# Patient Record
Sex: FEMALE | Race: WHITE | Employment: FULL TIME | URBAN - METROPOLITAN AREA
[De-identification: names, ages, dates, MRNs, and addresses within clinical notes are randomized per-mention and may not be internally consistent; named-entity substitution may affect disease eponyms.]

---

## 2019-02-04 ENCOUNTER — OFFICE VISIT (OUTPATIENT)
Dept: ORTHOPEDIC SURGERY | Age: 54
End: 2019-02-04
Payer: COMMERCIAL

## 2019-02-04 VITALS
WEIGHT: 115.96 LBS | HEIGHT: 64 IN | HEART RATE: 102 BPM | DIASTOLIC BLOOD PRESSURE: 64 MMHG | SYSTOLIC BLOOD PRESSURE: 100 MMHG | BODY MASS INDEX: 19.8 KG/M2

## 2019-02-04 DIAGNOSIS — S72.92XA CLOSED FRACTURE OF LEFT FEMUR, UNSPECIFIED FRACTURE MORPHOLOGY, UNSPECIFIED PORTION OF FEMUR, INITIAL ENCOUNTER (HCC): Primary | ICD-10-CM

## 2019-02-04 DIAGNOSIS — M25.562 ACUTE PAIN OF LEFT KNEE: ICD-10-CM

## 2019-02-04 PROCEDURE — 99242 OFF/OP CONSLTJ NEW/EST SF 20: CPT | Performed by: ORTHOPAEDIC SURGERY

## 2019-02-12 ENCOUNTER — OFFICE VISIT (OUTPATIENT)
Dept: ORTHOPEDIC SURGERY | Age: 54
End: 2019-02-12
Payer: COMMERCIAL

## 2019-02-12 VITALS
SYSTOLIC BLOOD PRESSURE: 140 MMHG | DIASTOLIC BLOOD PRESSURE: 79 MMHG | HEIGHT: 64 IN | WEIGHT: 147 LBS | BODY MASS INDEX: 25.1 KG/M2 | HEART RATE: 101 BPM

## 2019-02-12 DIAGNOSIS — S72.92XA CLOSED FRACTURE OF LEFT FEMUR, UNSPECIFIED FRACTURE MORPHOLOGY, INITIAL ENCOUNTER (HCC): Primary | ICD-10-CM

## 2019-02-12 PROCEDURE — L1812 KO ELASTIC W/JOINTS PRE OTS: HCPCS | Performed by: ORTHOPAEDIC SURGERY

## 2019-02-12 PROCEDURE — 99213 OFFICE O/P EST LOW 20 MIN: CPT | Performed by: ORTHOPAEDIC SURGERY

## 2019-02-12 RX ORDER — ESTRADIOL 0.05 MG/D
1 PATCH TRANSDERMAL
COMMUNITY

## 2019-02-12 RX ORDER — TOPIRAMATE 50 MG/1
TABLET, FILM COATED ORAL
COMMUNITY
Start: 2019-01-22

## 2019-02-12 RX ORDER — SULFAMETHOXAZOLE AND TRIMETHOPRIM 800; 160 MG/1; MG/1
2 TABLET ORAL
COMMUNITY
Start: 2019-02-08 | End: 2019-02-15

## 2019-02-12 RX ORDER — TOPIRAMATE 25 MG/1
25 TABLET ORAL
COMMUNITY

## 2019-03-06 ENCOUNTER — TELEPHONE (OUTPATIENT)
Dept: ORTHOPEDIC SURGERY | Age: 54
End: 2019-03-06

## 2019-03-08 ENCOUNTER — OFFICE VISIT (OUTPATIENT)
Dept: ORTHOPEDIC SURGERY | Age: 54
End: 2019-03-08
Payer: COMMERCIAL

## 2019-03-08 ENCOUNTER — TELEPHONE (OUTPATIENT)
Dept: ORTHOPEDIC SURGERY | Age: 54
End: 2019-03-08

## 2019-03-08 VITALS — HEIGHT: 65 IN | BODY MASS INDEX: 24.49 KG/M2 | WEIGHT: 147 LBS

## 2019-03-08 DIAGNOSIS — L08.9 WOUND INFECTION: Primary | ICD-10-CM

## 2019-03-08 DIAGNOSIS — T14.8XXA WOUND INFECTION: Primary | ICD-10-CM

## 2019-03-08 PROCEDURE — 99214 OFFICE O/P EST MOD 30 MIN: CPT | Performed by: ORTHOPAEDIC SURGERY

## 2019-03-08 RX ORDER — SULFAMETHOXAZOLE AND TRIMETHOPRIM 800; 160 MG/1; MG/1
1 TABLET ORAL 2 TIMES DAILY
Qty: 20 TABLET | Refills: 0 | Status: ON HOLD | OUTPATIENT
Start: 2019-03-08 | End: 2019-03-11 | Stop reason: ALTCHOICE

## 2019-03-11 ENCOUNTER — ANESTHESIA (OUTPATIENT)
Dept: OPERATING ROOM | Age: 54
End: 2019-03-11
Payer: COMMERCIAL

## 2019-03-11 ENCOUNTER — HOSPITAL ENCOUNTER (OUTPATIENT)
Age: 54
Setting detail: OUTPATIENT SURGERY
Discharge: HOME | End: 2019-03-11
Attending: ORTHOPAEDIC SURGERY | Admitting: ORTHOPAEDIC SURGERY
Payer: COMMERCIAL

## 2019-03-11 ENCOUNTER — ANESTHESIA EVENT (OUTPATIENT)
Dept: OPERATING ROOM | Age: 54
End: 2019-03-11
Payer: COMMERCIAL

## 2019-03-11 VITALS
BODY MASS INDEX: 24.99 KG/M2 | DIASTOLIC BLOOD PRESSURE: 51 MMHG | HEART RATE: 66 BPM | HEIGHT: 65 IN | SYSTOLIC BLOOD PRESSURE: 88 MMHG | RESPIRATION RATE: 16 BRPM | WEIGHT: 150 LBS | OXYGEN SATURATION: 99 % | TEMPERATURE: 97 F

## 2019-03-11 VITALS
OXYGEN SATURATION: 98 % | SYSTOLIC BLOOD PRESSURE: 94 MMHG | RESPIRATION RATE: 8 BRPM | DIASTOLIC BLOOD PRESSURE: 54 MMHG | TEMPERATURE: 96.8 F

## 2019-03-11 PROCEDURE — 6360000002 HC RX W HCPCS: Performed by: ORTHOPAEDIC SURGERY

## 2019-03-11 PROCEDURE — 3600000012 HC SURGERY LEVEL 2 ADDTL 15MIN: Performed by: ORTHOPAEDIC SURGERY

## 2019-03-11 PROCEDURE — 7100000010 HC PHASE II RECOVERY - FIRST 15 MIN: Performed by: ORTHOPAEDIC SURGERY

## 2019-03-11 PROCEDURE — 2500000003 HC RX 250 WO HCPCS

## 2019-03-11 PROCEDURE — 87205 SMEAR GRAM STAIN: CPT

## 2019-03-11 PROCEDURE — 6360000002 HC RX W HCPCS: Performed by: NURSE ANESTHETIST, CERTIFIED REGISTERED

## 2019-03-11 PROCEDURE — 2580000003 HC RX 258: Performed by: ORTHOPAEDIC SURGERY

## 2019-03-11 PROCEDURE — 2580000003 HC RX 258: Performed by: NURSE ANESTHETIST, CERTIFIED REGISTERED

## 2019-03-11 PROCEDURE — 2709999900 HC NON-CHARGEABLE SUPPLY: Performed by: ORTHOPAEDIC SURGERY

## 2019-03-11 PROCEDURE — 3600000002 HC SURGERY LEVEL 2 BASE: Performed by: ORTHOPAEDIC SURGERY

## 2019-03-11 PROCEDURE — 3700000001 HC ADD 15 MINUTES (ANESTHESIA): Performed by: ORTHOPAEDIC SURGERY

## 2019-03-11 PROCEDURE — 7100000011 HC PHASE II RECOVERY - ADDTL 15 MIN: Performed by: ORTHOPAEDIC SURGERY

## 2019-03-11 PROCEDURE — 2500000003 HC RX 250 WO HCPCS: Performed by: NURSE ANESTHETIST, CERTIFIED REGISTERED

## 2019-03-11 PROCEDURE — 87070 CULTURE OTHR SPECIMN AEROBIC: CPT

## 2019-03-11 PROCEDURE — 87077 CULTURE AEROBIC IDENTIFY: CPT

## 2019-03-11 PROCEDURE — 7100000001 HC PACU RECOVERY - ADDTL 15 MIN: Performed by: ORTHOPAEDIC SURGERY

## 2019-03-11 PROCEDURE — 87186 SC STD MICRODIL/AGAR DIL: CPT

## 2019-03-11 PROCEDURE — 7100000000 HC PACU RECOVERY - FIRST 15 MIN: Performed by: ORTHOPAEDIC SURGERY

## 2019-03-11 PROCEDURE — 3700000000 HC ANESTHESIA ATTENDED CARE: Performed by: ORTHOPAEDIC SURGERY

## 2019-03-11 PROCEDURE — 6360000002 HC RX W HCPCS: Performed by: ANESTHESIOLOGY

## 2019-03-11 RX ORDER — PROMETHAZINE HYDROCHLORIDE 25 MG/ML
6.25 INJECTION, SOLUTION INTRAMUSCULAR; INTRAVENOUS
Status: DISCONTINUED | OUTPATIENT
Start: 2019-03-11 | End: 2019-03-11 | Stop reason: HOSPADM

## 2019-03-11 RX ORDER — LIDOCAINE HYDROCHLORIDE 20 MG/ML
INJECTION, SOLUTION INFILTRATION; PERINEURAL PRN
Status: DISCONTINUED | OUTPATIENT
Start: 2019-03-11 | End: 2019-03-11 | Stop reason: SDUPTHER

## 2019-03-11 RX ORDER — ONDANSETRON 2 MG/ML
INJECTION INTRAMUSCULAR; INTRAVENOUS PRN
Status: DISCONTINUED | OUTPATIENT
Start: 2019-03-11 | End: 2019-03-11 | Stop reason: SDUPTHER

## 2019-03-11 RX ORDER — ACETAMINOPHEN 10 MG/ML
1000 INJECTION, SOLUTION INTRAVENOUS ONCE
Status: COMPLETED | OUTPATIENT
Start: 2019-03-11 | End: 2019-03-11

## 2019-03-11 RX ORDER — OXYCODONE HYDROCHLORIDE 5 MG/1
10 TABLET ORAL PRN
Status: DISCONTINUED | OUTPATIENT
Start: 2019-03-11 | End: 2019-03-11 | Stop reason: HOSPADM

## 2019-03-11 RX ORDER — CEFAZOLIN SODIUM 2 G/50ML
2 SOLUTION INTRAVENOUS ONCE
Status: COMPLETED | OUTPATIENT
Start: 2019-03-11 | End: 2019-03-11

## 2019-03-11 RX ORDER — MORPHINE SULFATE 10 MG/ML
1 INJECTION, SOLUTION INTRAMUSCULAR; INTRAVENOUS EVERY 5 MIN PRN
Status: DISCONTINUED | OUTPATIENT
Start: 2019-03-11 | End: 2019-03-11 | Stop reason: HOSPADM

## 2019-03-11 RX ORDER — FENTANYL CITRATE 50 UG/ML
INJECTION, SOLUTION INTRAMUSCULAR; INTRAVENOUS PRN
Status: DISCONTINUED | OUTPATIENT
Start: 2019-03-11 | End: 2019-03-11 | Stop reason: SDUPTHER

## 2019-03-11 RX ORDER — SODIUM CHLORIDE, SODIUM LACTATE, POTASSIUM CHLORIDE, CALCIUM CHLORIDE 600; 310; 30; 20 MG/100ML; MG/100ML; MG/100ML; MG/100ML
INJECTION, SOLUTION INTRAVENOUS CONTINUOUS
Status: DISCONTINUED | OUTPATIENT
Start: 2019-03-11 | End: 2019-03-11 | Stop reason: HOSPADM

## 2019-03-11 RX ORDER — PROPOFOL 10 MG/ML
INJECTION, EMULSION INTRAVENOUS PRN
Status: DISCONTINUED | OUTPATIENT
Start: 2019-03-11 | End: 2019-03-11 | Stop reason: SDUPTHER

## 2019-03-11 RX ORDER — HYDRALAZINE HYDROCHLORIDE 20 MG/ML
5 INJECTION INTRAMUSCULAR; INTRAVENOUS EVERY 10 MIN PRN
Status: DISCONTINUED | OUTPATIENT
Start: 2019-03-11 | End: 2019-03-11 | Stop reason: HOSPADM

## 2019-03-11 RX ORDER — DIPHENHYDRAMINE HYDROCHLORIDE 50 MG/ML
12.5 INJECTION INTRAMUSCULAR; INTRAVENOUS
Status: DISCONTINUED | OUTPATIENT
Start: 2019-03-11 | End: 2019-03-11 | Stop reason: HOSPADM

## 2019-03-11 RX ORDER — LIDOCAINE HYDROCHLORIDE 10 MG/ML
INJECTION, SOLUTION EPIDURAL; INFILTRATION; INTRACAUDAL; PERINEURAL
Status: COMPLETED
Start: 2019-03-11 | End: 2019-03-11

## 2019-03-11 RX ORDER — METOCLOPRAMIDE HYDROCHLORIDE 5 MG/ML
10 INJECTION INTRAMUSCULAR; INTRAVENOUS
Status: DISCONTINUED | OUTPATIENT
Start: 2019-03-11 | End: 2019-03-11 | Stop reason: HOSPADM

## 2019-03-11 RX ORDER — SODIUM CHLORIDE, SODIUM LACTATE, POTASSIUM CHLORIDE, CALCIUM CHLORIDE 600; 310; 30; 20 MG/100ML; MG/100ML; MG/100ML; MG/100ML
INJECTION, SOLUTION INTRAVENOUS CONTINUOUS PRN
Status: DISCONTINUED | OUTPATIENT
Start: 2019-03-11 | End: 2019-03-11 | Stop reason: SDUPTHER

## 2019-03-11 RX ORDER — LABETALOL HYDROCHLORIDE 5 MG/ML
5 INJECTION, SOLUTION INTRAVENOUS EVERY 10 MIN PRN
Status: DISCONTINUED | OUTPATIENT
Start: 2019-03-11 | End: 2019-03-11 | Stop reason: HOSPADM

## 2019-03-11 RX ORDER — MEPERIDINE HYDROCHLORIDE 25 MG/ML
12.5 INJECTION INTRAMUSCULAR; INTRAVENOUS; SUBCUTANEOUS EVERY 5 MIN PRN
Status: DISCONTINUED | OUTPATIENT
Start: 2019-03-11 | End: 2019-03-11 | Stop reason: HOSPADM

## 2019-03-11 RX ORDER — MIDAZOLAM HYDROCHLORIDE 1 MG/ML
INJECTION INTRAMUSCULAR; INTRAVENOUS PRN
Status: DISCONTINUED | OUTPATIENT
Start: 2019-03-11 | End: 2019-03-11 | Stop reason: SDUPTHER

## 2019-03-11 RX ORDER — CEFAZOLIN SODIUM 2 G/50ML
SOLUTION INTRAVENOUS PRN
Status: DISCONTINUED | OUTPATIENT
Start: 2019-03-11 | End: 2019-03-11 | Stop reason: SDUPTHER

## 2019-03-11 RX ORDER — EPHEDRINE SULFATE 50 MG/ML
INJECTION, SOLUTION INTRAVENOUS PRN
Status: DISCONTINUED | OUTPATIENT
Start: 2019-03-11 | End: 2019-03-11 | Stop reason: SDUPTHER

## 2019-03-11 RX ORDER — OXYCODONE HYDROCHLORIDE 5 MG/1
5 TABLET ORAL PRN
Status: DISCONTINUED | OUTPATIENT
Start: 2019-03-11 | End: 2019-03-11 | Stop reason: HOSPADM

## 2019-03-11 RX ADMIN — EPHEDRINE SULFATE 5 MG: 50 INJECTION, SOLUTION INTRAMUSCULAR; INTRAVENOUS; SUBCUTANEOUS at 08:05

## 2019-03-11 RX ADMIN — SODIUM CHLORIDE, POTASSIUM CHLORIDE, SODIUM LACTATE AND CALCIUM CHLORIDE: 600; 310; 30; 20 INJECTION, SOLUTION INTRAVENOUS at 07:48

## 2019-03-11 RX ADMIN — ACETAMINOPHEN 1000 MG: 10 INJECTION, SOLUTION INTRAVENOUS at 07:02

## 2019-03-11 RX ADMIN — CEFAZOLIN SODIUM 2 G: 2 SOLUTION INTRAVENOUS at 07:49

## 2019-03-11 RX ADMIN — FENTANYL CITRATE 100 MCG: 50 INJECTION INTRAMUSCULAR; INTRAVENOUS at 07:49

## 2019-03-11 RX ADMIN — LIDOCAINE HYDROCHLORIDE 0.1 ML: 10 INJECTION, SOLUTION EPIDURAL; INFILTRATION; INTRACAUDAL; PERINEURAL at 06:46

## 2019-03-11 RX ADMIN — HYDROMORPHONE HYDROCHLORIDE 0.5 MG: 1 INJECTION, SOLUTION INTRAMUSCULAR; INTRAVENOUS; SUBCUTANEOUS at 08:18

## 2019-03-11 RX ADMIN — CEFAZOLIN SODIUM 2 G: 2 SOLUTION INTRAVENOUS at 07:29

## 2019-03-11 RX ADMIN — PROPOFOL 150 MG: 10 INJECTION, EMULSION INTRAVENOUS at 07:49

## 2019-03-11 RX ADMIN — LIDOCAINE HYDROCHLORIDE 20 MG: 20 INJECTION, SOLUTION INFILTRATION; PERINEURAL at 07:49

## 2019-03-11 RX ADMIN — HYDROMORPHONE HYDROCHLORIDE 0.5 MG: 1 INJECTION, SOLUTION INTRAMUSCULAR; INTRAVENOUS; SUBCUTANEOUS at 08:48

## 2019-03-11 RX ADMIN — MIDAZOLAM 2 MG: 1 INJECTION INTRAMUSCULAR; INTRAVENOUS at 07:48

## 2019-03-11 RX ADMIN — ONDANSETRON 4 MG: 2 INJECTION, SOLUTION INTRAMUSCULAR; INTRAVENOUS at 07:48

## 2019-03-11 RX ADMIN — HYDROMORPHONE HYDROCHLORIDE 0.5 MG: 1 INJECTION, SOLUTION INTRAMUSCULAR; INTRAVENOUS; SUBCUTANEOUS at 08:31

## 2019-03-11 RX ADMIN — EPHEDRINE SULFATE 5 MG: 50 INJECTION, SOLUTION INTRAMUSCULAR; INTRAVENOUS; SUBCUTANEOUS at 07:57

## 2019-03-11 ASSESSMENT — PAIN SCALES - GENERAL
PAINLEVEL_OUTOF10: 6
PAINLEVEL_OUTOF10: 6
PAINLEVEL_OUTOF10: 7
PAINLEVEL_OUTOF10: 7

## 2019-03-11 ASSESSMENT — PAIN DESCRIPTION - DESCRIPTORS
DESCRIPTORS: BURNING

## 2019-03-11 ASSESSMENT — PULMONARY FUNCTION TESTS
PIF_VALUE: 15
PIF_VALUE: 15
PIF_VALUE: 17
PIF_VALUE: 14
PIF_VALUE: 15
PIF_VALUE: 13
PIF_VALUE: 15
PIF_VALUE: 14
PIF_VALUE: 7
PIF_VALUE: 15
PIF_VALUE: 14
PIF_VALUE: 15
PIF_VALUE: 15

## 2019-03-11 ASSESSMENT — PAIN DESCRIPTION - PAIN TYPE
TYPE: SURGICAL PAIN

## 2019-03-11 ASSESSMENT — PAIN - FUNCTIONAL ASSESSMENT
PAIN_FUNCTIONAL_ASSESSMENT: 0-10
PAIN_FUNCTIONAL_ASSESSMENT: PREVENTS OR INTERFERES SOME ACTIVE ACTIVITIES AND ADLS

## 2019-03-11 ASSESSMENT — PAIN DESCRIPTION - FREQUENCY: FREQUENCY: CONTINUOUS

## 2019-03-12 ENCOUNTER — TELEPHONE (OUTPATIENT)
Dept: ORTHOPEDIC SURGERY | Age: 54
End: 2019-03-12

## 2019-03-15 ENCOUNTER — OFFICE VISIT (OUTPATIENT)
Dept: ORTHOPEDIC SURGERY | Age: 54
End: 2019-03-15

## 2019-03-15 DIAGNOSIS — L03.116 CELLULITIS OF LEFT LOWER EXTREMITY: Primary | ICD-10-CM

## 2019-03-15 PROCEDURE — 99024 POSTOP FOLLOW-UP VISIT: CPT | Performed by: ORTHOPAEDIC SURGERY

## 2019-03-15 RX ORDER — SULFAMETHOXAZOLE AND TRIMETHOPRIM 800; 160 MG/1; MG/1
1 TABLET ORAL 2 TIMES DAILY
Qty: 20 TABLET | Refills: 0 | Status: SHIPPED | OUTPATIENT
Start: 2019-03-15 | End: 2019-03-25

## 2019-03-16 LAB
ANAEROBIC CULTURE: ABNORMAL
GRAM STAIN RESULT: ABNORMAL
ORGANISM: ABNORMAL
WOUND/ABSCESS: ABNORMAL

## 2019-03-20 ENCOUNTER — TELEPHONE (OUTPATIENT)
Dept: ORTHOPEDIC SURGERY | Age: 54
End: 2019-03-20

## 2019-03-20 DIAGNOSIS — T14.8XXA WOUND INFECTION: Primary | ICD-10-CM

## 2019-03-20 DIAGNOSIS — L03.116 CELLULITIS OF LEFT LOWER EXTREMITY: ICD-10-CM

## 2019-03-20 DIAGNOSIS — L08.9 WOUND INFECTION: Primary | ICD-10-CM

## 2020-09-28 ENCOUNTER — OFFICE VISIT (OUTPATIENT)
Dept: ORTHOPEDIC SURGERY | Age: 55
End: 2020-09-28
Payer: COMMERCIAL

## 2020-09-28 VITALS — HEIGHT: 65 IN | WEIGHT: 150 LBS | BODY MASS INDEX: 24.99 KG/M2

## 2020-09-28 PROCEDURE — 99203 OFFICE O/P NEW LOW 30 MIN: CPT | Performed by: ORTHOPAEDIC SURGERY

## 2020-09-28 NOTE — PROGRESS NOTES
CHIEF COMPLAINT:    Chief Complaint   Patient presents with    Shoulder Pain     RIGHT SHOULDER PAIN       HISTORY OF PRESENT ILLNESS:                The patient is a 63 y.o. female patient presents today for orthopedic evaluation of right shoulder. She remembers an injury 2 weeks ago when she was carrying some 6 x 6 pieces of wood. She turned to the side and felt a sharp pull in her aright shoulder. Since then she has had fairly significant pain and decreased range of motion. She recently had a mare have a baby and has been trying to keep up with cleaning the stall. She states that she really has significant weakness in her shoulder this activity. She points directly to the greater tuberosity as the source of her pain. She denies any numbness or tingling. She is right-handed    He has a history of parotid cancer  Past Medical History:   Diagnosis Date    Closed traumatic brain injury ([facility] 1998    kicked by her horse    Diabetes insipidus ([facility]     Diverticulitis 2009    MRSA nasal colonization 5/5/13    Negative by DNA probe    Nausea & vomiting     Needs pre-anesthesia assessment 2009    patient states unknown problem with colon resection    Seizure ([facility] [facility]     GRAND MAL WITH DEHYDRATION    Thyroid disease         Work Status: She shows horses    The pain assessment was noted & is as follows:  Pain Assessment  Location of Pain: Shoulder  Location Modifiers: Right  Severity of Pain: 4  Quality of Pain: Sharp, Dull, Aching  Duration of Pain: A few hours  Frequency of Pain: Several times daily  Aggravating Factors:  Other (Comment), Bending(CERTAIN ROM)  Limiting Behavior: Some  Relieving Factors: Rest  Result of Injury: No  Work-Related Injury: No  Are there other pain locations you wish to document?: No]      Work Status/Functionality:     Past Medical History: Medical history form was reviewed today & can be found in the media tab  Past Medical History:   Diagnosis Date    Closed traumatic brain injury [facility])     kicked by her horse    Diabetes insipidus [facility]     Diverticulitis     MRSA nasal colonization 13    Negative by DNA probe    Nausea & vomiting     Needs pre-anesthesia assessment     patient states unknown problem with colon resection    Seizure ([facility] [address] [facility]     GRAND MAL WITH DEHYDRATION    Thyroid disease       Past Surgical History:     Past Surgical History:   Procedure Laterality Date     SECTION  [id number],6521    x 3    COLON SURGERY  2009    INCISION AND DRAINAGE Left 3/11/2019    INCISION, DRAINAGE AND DEBRIDEMENT OF LEFT TIBIA performed by James Garcia MD at . [address] ARTHROSCOPY  2011    LEFT KNEE VIDEO ARTHROSCOPY, partial LATERAL MENISCECTOMY,    KNEE SURGERY      right    WISDOM TOOTH EXTRACTION      WRIST SURGERY      left x 2     Current Medications:     Current Outpatient Medications:     topiramate (TOPAMAX) 50 MG tablet, , Disp: , Rfl:     topiramate (TOPAMAX) 25 MG tablet, Take 25 mg by mouth, Disp: , Rfl:     progesterone (PROMETRIUM) 100 MG capsule, , Disp: , Rfl:     estradiol (CLIMARA) 0.05 MG/24HR, Place 1 patch onto the skin, Disp: , Rfl:     desmopressin (DDAVP) 0.1 MG tablet, Take 0.1 mg by mouth 3 times daily. , Disp: , Rfl:     Levothyroxine Sodium (SYNTHROID PO), Take 75 mcg by mouth daily. , Disp: , Rfl:     Cyanocobalamin (VITAMIN B 12 PO), Take  by mouth daily. , Disp: , Rfl:     vitamin D (CHOLECALCIFEROL) 400 UNIT TABS tablet, Take 400 Units by mouth daily. , Disp: , Rfl:   Allergies:  Percocet [oxycodone-acetaminophen] and Azithromycin  Social History:    reports that she has never smoked. She has never used smokeless tobacco. She reports current alcohol use of about 2.0 standard drinks of alcohol per week. She reports that she does not use drugs.   Family History:   Family History   Problem Relation Age of Onset    Cancer Father     Asthma Mother     High Cholesterol Paternal Grandmother  Stroke Paternal Grandmother     Cancer Paternal Grandfather        REVIEW OF SYSTEMS:   For new problems, a full review of systems will be found scanned in the patient's chart. CONSTITUTIONAL: Denies unexplained weight loss, fevers, chills   NEUROLOGICAL: Denies unsteady gait or progressive weakness  SKIN: Denies skin changes, delayed healing, rash, itching       PHYSICAL EXAM:    Vitals: Height 5' 4.5\" (1.638 m), weight 150 lb (68 kg), last menstrual period 01/27/2012. GENERAL EXAM:  · General Apparence: Patient is adequately groomed with no evidence of malnutrition. · Orientation: The patient is oriented to time, place and person. · Mood & Affect:The patient's mood and affect are appropriate       right shoulder PHYSICAL EXAMINATION:  · Inspection: No obvious swelling ecchymosis or erythema    · Palpation: Tender over the anterior shoulder near the bicipital groove and also the greater tuberosity. No significant tenderness at the [facility] joint. · Range of Motion: 0 to 140 degrees of forward flexion and 0 to 90 degrees of abduction with pain. · Strength: Moderate to severe pain and moderate weakness with resisted supraspinatus testing and infraspinatus testing. She also has increased pain and weakness with Cimarron's testing. · Special Tests:  strength intact          · Skin:  There are no rashes, ulcerations or lesions. · There are no distal dysvascular changes     Gait & station: she ambulates today unassisted      Additional Examinations:        Left Upper Extremity: Examination of the left upper extremity does not show any tenderness, deformity or injury. Range of motion is unremarkable. There is no gross instability. There are no rashes, ulcerations or lesions. Strength and tone are normal.      Diagnostic Testing: The following x rays were read and interpreted by myself      1.  3 x-ray views of the right shoulder were reviewed today.   No evidence of acute abnormality    Orders     Orders Placed This Encounter   Procedures    XR SHOULDER RIGHT (MIN 2 VIEWS)     Standing Status:   Future     Number of Occurrences:   1     Standing Expiration Date:   9/28/2021     Order Specific Question:   Reason for exam:     Answer:   PAIN         Assessment / Treatment Plan:     1. Right shoulder suspected rotator cuff tear: The patient has a very active lifestyle. 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 I recommend an MRI scan of her aright shoulder. We will see her back to discuss the results    I have personally performed and/or participated in the history, exam and medical decision making and agree with all pertinent clinical information. I have also reviewed and agree with the past medical, family and social history unless otherwise noted. This dictation was performed with a verbal recognition program (DRAGON) and it was checked for errors. It is possible that there are still dictated errors within this office note. If so, please bring any errors to my attention for an addendum. All efforts were made to ensure that this office note is accurate.           Palomo Santos MD

## 2020-09-29 ENCOUNTER — TELEPHONE (OUTPATIENT)
Dept: ORTHOPEDIC SURGERY | Age: 55
End: 2020-09-29

## 2020-09-29 NOTE — TELEPHONE ENCOUNTER
CALLED LVM FOR PATIENT TODAY, STATING MRI IS APPROVED FOR [place], & LEFT # FOR THEM TO CALL & SCHEDULE THAT. & TO MAKE AN FOLLOW UP APPT W/  AFTER MRI.  [employer] Gila Jensen

## 2020-10-12 ENCOUNTER — OFFICE VISIT (OUTPATIENT)
Dept: ORTHOPEDIC SURGERY | Age: 55
End: 2020-10-12
Payer: COMMERCIAL

## 2020-10-12 VITALS — HEIGHT: 64 IN | WEIGHT: 150 LBS | BODY MASS INDEX: 25.61 KG/M2

## 2020-10-12 PROCEDURE — 99214 OFFICE O/P EST MOD 30 MIN: CPT | Performed by: ORTHOPAEDIC SURGERY

## 2020-10-12 RX ORDER — BUPIVACAINE HYDROCHLORIDE 2.5 MG/ML
30 INJECTION, SOLUTION INFILTRATION; PERINEURAL ONCE
Status: COMPLETED | OUTPATIENT
Start: 2020-10-12 | End: 2020-10-12

## 2020-10-12 RX ORDER — LIDOCAINE HYDROCHLORIDE 10 MG/ML
20 INJECTION, SOLUTION INFILTRATION; PERINEURAL ONCE
Status: COMPLETED | OUTPATIENT
Start: 2020-10-12 | End: 2020-10-12

## 2020-10-12 RX ORDER — METHYLPREDNISOLONE ACETATE 40 MG/ML
80 INJECTION, SUSPENSION INTRA-ARTICULAR; INTRALESIONAL; INTRAMUSCULAR; SOFT TISSUE ONCE
Status: COMPLETED | OUTPATIENT
Start: 2020-10-12 | End: 2020-10-12

## 2020-10-12 RX ADMIN — BUPIVACAINE HYDROCHLORIDE 75 MG: 2.5 INJECTION, SOLUTION INFILTRATION; PERINEURAL at 18:18

## 2020-10-12 RX ADMIN — LIDOCAINE HYDROCHLORIDE 20 ML: 10 INJECTION, SOLUTION INFILTRATION; PERINEURAL at 18:18

## 2020-10-12 RX ADMIN — METHYLPREDNISOLONE ACETATE 80 MG: 40 INJECTION, SUSPENSION INTRA-ARTICULAR; INTRALESIONAL; INTRAMUSCULAR; SOFT TISSUE at 18:18

## 2020-10-12 NOTE — PROGRESS NOTES
CHIEF COMPLAINT:    Chief Complaint   Patient presents with    Shoulder Pain     RIGHT SHOULDER MRI RESULTS       HISTORY OF PRESENT ILLNESS:                The patient is a 63 y.o. female returns to clinic to review her MRI results of the right shoulder. Is a woman who had an injury to the shoulder while carrying some wood couple of weeks ago. She said she has had some intermittent pain in the shoulder over the years however, has not been significantly limited by the pain. Recently, she is having more pain with certain external and internal rotation motions. She is also having to do a lot of work around the house caring for barn animals    Past Medical History:   Diagnosis Date    Closed traumatic brain injury ([facility] 1998    kicked by her horse    Diabetes insipidus ([facility] [address] [facility]     Diverticulitis 2009    MRSA nasal colonization 5/5/13    Negative by DNA probe    Nausea & vomiting     Needs pre-anesthesia assessment 2009    patient states unknown problem with colon resection    [facility]     GRAND MAL WITH DEHYDRATION    Thyroid disease         Work Status: She works for a [employer]    The pain assessment was noted & is as follows:  Pain Assessment  Location of Pain: Shoulder  Location Modifiers: Right  Severity of Pain: 5  Quality of Pain: Sharp, Dull, Aching  Duration of Pain: A few hours  Frequency of Pain: Several times daily  Aggravating Factors:  Other (Comment), Stretching, Bending(CERTAIN MOVEMENTS)  Limiting Behavior: Some  Relieving Factors: Rest]      Work Status/Functionality:     Past Medical History: Medical history form was reviewed today & can be found in the media tab  Past Medical History:   Diagnosis Date    [facility]    kicked by her horse    Diabetes insipidus ([facility] [facility]     Diverticulitis 2009    MRSA nasal colonization 5/5/13    Negative by DNA probe    Nausea & vomiting     Needs pre-anesthesia assessment 2009    patient states unknown problem with colon resection    Seizure ([address]     GRAND MAL WITH DEHYDRATION    Thyroid disease       Past Surgical History:     Past Surgical History:   Procedure Laterality Date     SECTION  [id number]    x 3    COLON SURGERY  2009    INCISION AND DRAINAGE Left 3/11/2019    INCISION, DRAINAGE AND DEBRIDEMENT OF LEFT TIBIA performed by Stephenie Lund MD at . [address] ARTHROSCOPY  2011    LEFT KNEE VIDEO ARTHROSCOPY, partial LATERAL MENISCECTOMY,    KNEE SURGERY      right    WISDOM TOOTH EXTRACTION      WRIST SURGERY      left x 2     Current Medications:     Current Outpatient Medications:     topiramate (TOPAMAX) 50 MG tablet, , Disp: , Rfl:     topiramate (TOPAMAX) 25 MG tablet, Take 25 mg by mouth, Disp: , Rfl:     progesterone (PROMETRIUM) 100 MG capsule, , Disp: , Rfl:     estradiol (CLIMARA) 0.05 MG/24HR, Place 1 patch onto the skin, Disp: , Rfl:     desmopressin (DDAVP) 0.1 MG tablet, Take 0.1 mg by mouth 3 times daily. , Disp: , Rfl:     Levothyroxine Sodium (SYNTHROID PO), Take 75 mcg by mouth daily. , Disp: , Rfl:     Cyanocobalamin (VITAMIN B 12 PO), Take  by mouth daily. , Disp: , Rfl:     vitamin D (CHOLECALCIFEROL) 400 UNIT TABS tablet, Take 400 Units by mouth daily. , Disp: , Rfl:   Allergies:  Percocet [oxycodone-acetaminophen] and Azithromycin  Social History:    reports that she has never smoked. She has never used smokeless tobacco. She reports current alcohol use of about 2.0 standard drinks of alcohol per week. She reports that she does not use drugs. Family History:   Family History   Problem Relation Age of Onset    Cancer Father     Asthma Mother     High Cholesterol Paternal Grandmother     Stroke Paternal Grandmother     Cancer Paternal Grandfather        REVIEW OF SYSTEMS:   For new problems, a full review of systems will be found scanned in the patient's chart.   CONSTITUTIONAL: Denies unexplained weight loss, fevers, chills NEUROLOGICAL: Denies unsteady gait or progressive weakness  SKIN: Denies skin changes, delayed healing, rash, itching       PHYSICAL EXAM:    Vitals: Height 5' 4.49\" (1.638 m), weight 150 lb (68 kg), last menstrual period 2012. GENERAL EXAM:  · General Apparence: Patient is adequately groomed with no evidence of malnutrition. · Orientation: The patient is oriented to time, place and person. · Mood & Affect:The patient's mood and affect are appropriate       Right shoulder PHYSICAL EXAMINATION:  · Inspection: No visible deformity. No significant edema, erythema or ecchymosis. · Palpation: Tenderness to palpation primarily over the subacromial space    · Range of Motion: Range of motion is not significantly limited however, is painful on extreme internal and external rotation    · Strength: No gross strength deficits are noted    · Special Tests: Neurovascular exam is intact the distal extremity          · Skin:  There are no rashes, ulcerations or lesions. · There are no dysvascular changes     Gait & station: Normal      Additional Examinations:        Left Upper Extremity: Examination of the left upper extremity does not show any tenderness, deformity or injury. Range of motion is unremarkable. There is no gross instability. There are no rashes, ulcerations or lesions. Strength and tone are normal.      Diagnostic Testin. MRI of the right shoulder demonstrates mild to moderate insertional tendinosis of supraspinatus tendon without rotator cuff macrotear. Nondisplaced tear of the anterior inferior glenoid labrum with a tiny subchondral cyst within the adjacent anterior inferior glenoid rim    I did review the actual images from her MRI and she does not fact have a very small subchondral cyst by this labrum.   This does not look concerning whatsoever and appears to be a very small cyst consistent with the patient's chronic labral tear    Orders     Orders Placed This Encounter Procedures    US ARTHR/ASP/INJ MAJOR JNT/BURSA RIGHT     Order Specific Question:   Reason for exam:     Answer:   pain         Assessment / Treatment Plan:     1. Right shoulder rotator cuff tendinitis    2. Shoulder chronic labral tear    We discussed treatment options with both conservative and aggressive options. He is interested in exploring conservative options first.  We discussed these options and she is interested in a cortisone injection and some home physical therapy exercises. She will return to the clinic if her symptoms do not improve with this treatment. We did discuss possible arthroscopic surgery if her symptoms persist.    I discussed in detail the risk, benefits, and complications of an injection which included but are not limited to infection, skin reactions, hot swollen joints, and anaphylaxis with the patient. The patient verbalized good understanding and gave informed consent for the injection. The skin was prepped using sterile alcohol solution. A sterile 22-gauge needle was inserted into the subacromial space and a mixture of 4 mL of 2% Carbocaine, 4 mL of 0.25% Marcaine, and 80 mg of Depo-Medrol was injected under sterile technique. The needle was withdrawn and the puncture site sealed with a Band-Aid. Technique: Under sterile conditions a [employer] ultrasound unit with a variable frequency (6.0-15.0 MHz) linear transducer was used to localize the placement of a 22-gauge needle into the right subacromial space. Findings: Successful needle placement for  subacromial space injection. Final images were taken and saved for permanent record. The patient tolerated the injection well. The patient was instructed to call the office immediately if there is any pain, redness, warmth, fever, or chills.       I spent 25 minutes face-to-face with the patient and greater than 50% of that time was spent counseling/coordinating care for the above-stated diagnosis

## 2023-01-17 ENCOUNTER — OFFICE VISIT (OUTPATIENT)
Dept: ORTHOPEDIC SURGERY | Age: 58
End: 2023-01-17

## 2023-01-17 VITALS — BODY MASS INDEX: 23.49 KG/M2 | HEIGHT: 65 IN | WEIGHT: 141 LBS

## 2023-01-17 DIAGNOSIS — M25.512 PAIN OF LEFT SHOULDER REGION: Primary | ICD-10-CM

## 2023-01-17 RX ORDER — DULOXETIN HYDROCHLORIDE 60 MG/1
60 CAPSULE, DELAYED RELEASE ORAL DAILY
COMMUNITY
Start: 2023-01-04

## 2023-01-17 RX ORDER — FLUCONAZOLE 50 MG/1
50 TABLET ORAL DAILY
COMMUNITY

## 2023-01-17 RX ORDER — FEXOFENADINE HCL AND PSEUDOEPHEDRINE HCI 60; 120 MG/1; MG/1
1 TABLET, EXTENDED RELEASE ORAL
COMMUNITY

## 2023-01-17 RX ORDER — MIDODRINE HYDROCHLORIDE 10 MG/1
10 TABLET ORAL 3 TIMES DAILY
COMMUNITY
Start: 2022-09-02

## 2023-01-17 RX ORDER — METOCLOPRAMIDE 10 MG/1
TABLET ORAL
COMMUNITY
Start: 2022-03-15

## 2023-01-17 RX ORDER — METHYLPHENIDATE HYDROCHLORIDE 20 MG/1
20 TABLET ORAL 2 TIMES DAILY
COMMUNITY
Start: 2023-01-04 | End: 2023-02-03

## 2023-01-17 RX ORDER — MECLIZINE HYDROCHLORIDE 25 MG/1
25 TABLET ORAL 3 TIMES DAILY PRN
COMMUNITY
Start: 2021-04-12

## 2023-01-17 RX ORDER — LEVOTHYROXINE SODIUM 0.1 MG/1
TABLET ORAL
COMMUNITY
Start: 2022-10-29
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
## 2023-01-19 ENCOUNTER — TELEPHONE (OUTPATIENT)
Dept: ORTHOPEDIC SURGERY | Age: 58
End: 2023-01-19

## 2023-01-19 NOTE — TELEPHONE ENCOUNTER
Prescription Refill     Medication Name:  MED NOT SENT D INTO [facility] AT AFTER HOURS 01/17/23    Pharmacy: [facility], [address]    Patient Contact Number:  [phone]     Pt SAW IVANA @ AFTER HOURS AND MED WAS SUPPOSED TO BE SENT IN FOR HER. PLEASE SEND IN AND PLEASE CALL Pt TO LET HER KNOW IT HAS BEEN TAKEN CARE OF, SO SHE CAN P/U.

## 2023-01-20 RX ORDER — CYCLOBENZAPRINE HCL 10 MG
10 TABLET ORAL NIGHTLY PRN
Qty: 30 TABLET | Refills: 0 | Status: SHIPPED | OUTPATIENT
Start: 2023-01-20 | End: 2023-01-30

## 2023-01-30 ENCOUNTER — OFFICE VISIT (OUTPATIENT)
Dept: ORTHOPEDIC SURGERY | Age: 58
End: 2023-01-30
Payer: COMMERCIAL

## 2023-01-30 VITALS — WEIGHT: 141 LBS | HEIGHT: 65 IN | BODY MASS INDEX: 23.49 KG/M2

## 2023-01-30 DIAGNOSIS — M75.02 ADHESIVE CAPSULITIS OF LEFT SHOULDER: Primary | ICD-10-CM

## 2023-01-30 PROCEDURE — 99213 OFFICE O/P EST LOW 20 MIN: CPT | Performed by: STUDENT IN AN ORGANIZED HEALTH CARE EDUCATION/TRAINING PROGRAM

## 2023-01-30 PROCEDURE — 20610 DRAIN/INJ JOINT/BURSA W/O US: CPT | Performed by: STUDENT IN AN ORGANIZED HEALTH CARE EDUCATION/TRAINING PROGRAM

## 2023-01-30 RX ORDER — BUPIVACAINE HYDROCHLORIDE 2.5 MG/ML
4 INJECTION, SOLUTION INFILTRATION; PERINEURAL ONCE
Status: COMPLETED | OUTPATIENT
Start: 2023-01-30 | End: 2023-01-30

## 2023-01-30 RX ORDER — METHYLPREDNISOLONE ACETATE 40 MG/ML
80 INJECTION, SUSPENSION INTRA-ARTICULAR; INTRALESIONAL; INTRAMUSCULAR; SOFT TISSUE ONCE
Status: COMPLETED | OUTPATIENT
Start: 2023-01-30 | End: 2023-01-30

## 2023-01-30 RX ADMIN — BUPIVACAINE HYDROCHLORIDE 10 MG: 2.5 INJECTION, SOLUTION INFILTRATION; PERINEURAL at 14:09

## 2023-01-30 RX ADMIN — METHYLPREDNISOLONE ACETATE 80 MG: 40 INJECTION, SUSPENSION INTRA-ARTICULAR; INTRALESIONAL; INTRAMUSCULAR; SOFT TISSUE at 14:08
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
## 2023-02-06 ENCOUNTER — HOSPITAL ENCOUNTER (OUTPATIENT)
Dept: PHYSICAL THERAPY | Age: 58
Setting detail: THERAPIES SERIES
Discharge: HOME | End: 2023-02-06
Payer: COMMERCIAL

## 2023-02-06 PROCEDURE — 97161 PT EVAL LOW COMPLEX 20 MIN: CPT

## 2023-02-06 PROCEDURE — 97110 THERAPEUTIC EXERCISES: CPT

## 2023-02-06 PROCEDURE — 97140 MANUAL THERAPY 1/> REGIONS: CPT
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
## 2023-02-06 NOTE — PROGRESS NOTES
[facility] [facility], [address] [place] [address], [address] [place] [address] [address]  Phone: [phone]   Fax: [phone]    Date: 2023          Patient Name; :  Jagdish Roles; 1965   Dx:  M75.02 (ICD-10-CM) - Adhesive capsulitis of left shoulder      Physician:  Christopher Suarez DO        Total PT Visits:      Measures Previous Current   Pain (0-10)                Assessment:        Prognosis for POC: [] Good [] Fair  [] Poor      Patient requires continued skilled intervention: [] Yes  [] No        Plan & Recommendations:  [] Continue rehabilitation due to objective improvement and continued functional deficits with frequency and duration:   [] Progress toward  []GAP, []Work Conditioning, []Independent HEP   [] Discharge due to   [] All goals achieved, [] Maximized \"medical necessity\" [] No subjective or objective improvements      Electronically signed by:  Chris Uribe PT  Therapy Plan of Care Re-Certification  This patient has been re-evaluated for physical therapy services and for therapy to continue, Medicare, Medicaid and other insurances require periodic physician review of the treatment plan. Please review the above re-evaluation and verify that you agree with plan of care as established above by signing the attached document and return it to our office or note changes to established plan below  [] Follow treatment plan as above [] Discontinue physical therapy  [] Change plan to:                                 __________________________________________________    Physician Signature:____________________________________ Date:____________  By signing above, therapists plan is approved by physician    If you have any questions or concerns, please don't hesitate to call.   Thank you for your referral.
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
## 2023-02-10 ENCOUNTER — HOSPITAL ENCOUNTER (OUTPATIENT)
Dept: PHYSICAL THERAPY | Age: 58
Setting detail: THERAPIES SERIES
Discharge: HOME | End: 2023-02-10
Payer: COMMERCIAL

## 2023-02-10 PROCEDURE — 97112 NEUROMUSCULAR REEDUCATION: CPT

## 2023-02-10 PROCEDURE — 97140 MANUAL THERAPY 1/> REGIONS: CPT

## 2023-02-10 PROCEDURE — 97110 THERAPEUTIC EXERCISES: CPT
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
## 2023-02-16 ENCOUNTER — HOSPITAL ENCOUNTER (OUTPATIENT)
Dept: PHYSICAL THERAPY | Age: 58
Setting detail: THERAPIES SERIES
Discharge: HOME | End: 2023-02-16
Payer: COMMERCIAL

## 2023-02-16 PROCEDURE — 97140 MANUAL THERAPY 1/> REGIONS: CPT

## 2023-02-16 PROCEDURE — 97110 THERAPEUTIC EXERCISES: CPT

## 2023-02-16 PROCEDURE — 97112 NEUROMUSCULAR REEDUCATION: CPT
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
## 2023-02-21 ENCOUNTER — HOSPITAL ENCOUNTER (OUTPATIENT)
Dept: PHYSICAL THERAPY | Age: 58
Setting detail: THERAPIES SERIES
Discharge: HOME | End: 2023-02-21
Payer: COMMERCIAL

## 2023-02-21 PROCEDURE — 97110 THERAPEUTIC EXERCISES: CPT

## 2023-02-21 PROCEDURE — 97140 MANUAL THERAPY 1/> REGIONS: CPT

## 2023-02-21 PROCEDURE — 97112 NEUROMUSCULAR REEDUCATION: CPT
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
## 2023-03-10 ENCOUNTER — HOSPITAL ENCOUNTER (OUTPATIENT)
Dept: PHYSICAL THERAPY | Age: 58
Setting detail: THERAPIES SERIES
Discharge: HOME | End: 2023-03-10
Payer: COMMERCIAL

## 2023-03-10 NOTE — FLOWSHEET NOTE
[facility], [address] [place] [address], [address] [place] [address] [address]  Phone: [phone]   Fax:     [phone]    Physical Therapy  Cancellation/No-show Note  Patient Name:  Renold Goltz  :  1965   Date:  3/10/2023    Cancelled visits to date: 1  No-shows to date: 0    For today's appointment patient:  [x]  Cancelled  []  Rescheduled appointment  []  No-show     Reason given by patient:  []  Patient ill  []  Conflicting appointment  []  No transportation    []  Conflict with work  []  No reason given  []  Other:     Comments:      Phone call information:   [x]  Phone call made today to patient at am/pm at the number provided:      []  Patient answered, conversation as follows:    []  Patient did not answer, message left as follows:  []  Phone call not needed - pt contacted us to cancel and provided reason for cancellation.      Electronically signed by:  Erich Burks PT, PT

## 2023-03-13 ENCOUNTER — OFFICE VISIT (OUTPATIENT)
Dept: ORTHOPEDIC SURGERY | Age: 58
End: 2023-03-13
Payer: COMMERCIAL

## 2023-03-13 ENCOUNTER — HOSPITAL ENCOUNTER (OUTPATIENT)
Dept: PHYSICAL THERAPY | Age: 58
Setting detail: THERAPIES SERIES
Discharge: HOME | End: 2023-03-13
Payer: COMMERCIAL

## 2023-03-13 VITALS — HEIGHT: 65 IN | WEIGHT: 141 LBS | BODY MASS INDEX: 23.49 KG/M2

## 2023-03-13 DIAGNOSIS — M25.512 PAIN OF LEFT SHOULDER REGION: ICD-10-CM

## 2023-03-13 DIAGNOSIS — M75.02 ADHESIVE CAPSULITIS OF LEFT SHOULDER: Primary | ICD-10-CM

## 2023-03-13 PROCEDURE — 97110 THERAPEUTIC EXERCISES: CPT

## 2023-03-13 PROCEDURE — 97140 MANUAL THERAPY 1/> REGIONS: CPT

## 2023-03-13 PROCEDURE — 99213 OFFICE O/P EST LOW 20 MIN: CPT | Performed by: STUDENT IN AN ORGANIZED HEALTH CARE EDUCATION/TRAINING PROGRAM

## 2023-03-13 PROCEDURE — 97112 NEUROMUSCULAR REEDUCATION: CPT

## 2023-03-13 RX ORDER — CEPHALEXIN 500 MG/1
CAPSULE ORAL
COMMUNITY
Start: 2023-02-27
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
## 2023-03-13 NOTE — PROGRESS NOTES
[facility]23 [facility], [address] [place] [address], [address] [place] [address] [address]  Phone: [phone]   Fax: [phone]    Date: 3/13/2023          Patient Name; :  Juliano Vigil; 1965   Dx:  M75.02 (ICD-10-CM) - Adhesive capsulitis of left shoulder      Physician:  Trish Almendarez DO        Total PT Visits: 5     Measures Previous Current   Pain (0-10)     Quick dash 73% 43%     AROM:  Flexion: 110  Abduction: 80  ER: 57  IR:L4     MMT:  Flexion: 4+  Abduction: 4  ER: 4+  IR: 4+    Assessment:  Brianne Rae has been seen in PT for 5 visits for left shoulder pain and loss of ROM. Pt has responded well overall to PT session which have been addressing ROM and strength. Her ROM into flexion and internal rotation have improved. Significant improvement in functional questionnaire Strength is mildly limited. Pt will continue to benefit from PT addressing deficits in order to regain full function and improve QOL. Prognosis for POC: [x] Good [] Fair  [] Poor      Patient requires continued skilled intervention: [x] Yes  [] No        Plan & Recommendations:  [] Continue rehabilitation due to objective improvement and continued functional deficits with frequency and duration:   [] Progress toward  []GAP, []Work Conditioning, []Independent HEP   [] Discharge due to   [] All goals achieved, [] Maximized \"medical necessity\" [] No subjective or objective improvements      Electronically signed by:  Lyman Seip, PT  Therapy Plan of Care Re-Certification  This patient has been re-evaluated for physical therapy services and for therapy to continue, Medicare, Medicaid and other insurances require periodic physician review of the treatment plan.  Please review the above re-evaluation and verify that you agree with plan of care as established above by signing the attached document and return it to our office or note changes to established plan below  [] Follow treatment plan as above [] Discontinue physical therapy  [] Change plan to:                                 __________________________________________________    Physician Signature:____________________________________ Date:____________  By signing above, therapists plan is approved by physician    If you have any questions or concerns, please don't hesitate to call.   Thank you for your referral.
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
## 2023-03-20 ENCOUNTER — HOSPITAL ENCOUNTER (OUTPATIENT)
Dept: PHYSICAL THERAPY | Age: 58
Setting detail: THERAPIES SERIES
Discharge: HOME | End: 2023-03-20
Payer: COMMERCIAL

## 2023-03-20 NOTE — FLOWSHEET NOTE
[address] [facility], [address] [place] [address], [address] [place] [address] [address]  Phone: [phone]   Fax:     [phone]    Physical Therapy  Cancellation/No-show Note  Patient Name:  Caryl Leventhal  :  1965   Date:  3/20/2023    Cancelled visits to date: 1  No-shows to date: 1    For today's appointment patient:  []  Cancelled  []  Rescheduled appointment  [x]  No-show     Reason given by patient:  []  Patient ill  []  Conflicting appointment  []  No transportation    []  Conflict with work  []  No reason given  []  Other:     Comments:      Phone call information:   [x]  Phone call made today to patient at am/pm at the number provided:      []  Patient answered, conversation as follows:    []  Patient did not answer, message left as follows:  []  Phone call not needed - pt contacted us to cancel and provided reason for cancellation.      Electronically signed by:  Saida Sanchez, PT, PT

## 2023-03-23 ENCOUNTER — HOSPITAL ENCOUNTER (OUTPATIENT)
Dept: PHYSICAL THERAPY | Age: 58
Setting detail: THERAPIES SERIES
Discharge: HOME | End: 2023-03-23
Payer: COMMERCIAL

## 2023-03-23 PROCEDURE — 97140 MANUAL THERAPY 1/> REGIONS: CPT

## 2023-03-23 PROCEDURE — 97110 THERAPEUTIC EXERCISES: CPT

## 2023-03-23 PROCEDURE — 97112 NEUROMUSCULAR REEDUCATION: CPT
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
## 2023-06-29 ENCOUNTER — OFFICE VISIT (OUTPATIENT)
Dept: ORTHOPEDIC SURGERY | Age: 58
End: 2023-06-29

## 2023-06-29 DIAGNOSIS — M75.02 ADHESIVE CAPSULITIS OF LEFT SHOULDER: Primary | ICD-10-CM

## 2023-06-29 RX ORDER — BUPIVACAINE HYDROCHLORIDE 2.5 MG/ML
4 INJECTION, SOLUTION INFILTRATION; PERINEURAL ONCE
Status: COMPLETED | OUTPATIENT
Start: 2023-06-29 | End: 2023-06-29

## 2023-06-29 RX ORDER — METHYLPREDNISOLONE ACETATE 40 MG/ML
80 INJECTION, SUSPENSION INTRA-ARTICULAR; INTRALESIONAL; INTRAMUSCULAR; SOFT TISSUE ONCE
Status: COMPLETED | OUTPATIENT
Start: 2023-06-29 | End: 2023-06-29

## 2023-06-29 RX ADMIN — METHYLPREDNISOLONE ACETATE 80 MG: 40 INJECTION, SUSPENSION INTRA-ARTICULAR; INTRALESIONAL; INTRAMUSCULAR; SOFT TISSUE at 10:10

## 2023-06-29 RX ADMIN — BUPIVACAINE HYDROCHLORIDE 10 MG: 2.5 INJECTION, SOLUTION INFILTRATION; PERINEURAL at 10:09

## 2023-09-07 ENCOUNTER — OFFICE VISIT (OUTPATIENT)
Dept: ORTHOPEDIC SURGERY | Age: 58
End: 2023-09-07

## 2023-09-07 DIAGNOSIS — M75.02 ADHESIVE CAPSULITIS OF LEFT SHOULDER: Primary | ICD-10-CM

## 2023-09-07 DIAGNOSIS — M79.672 LEFT FOOT PAIN: ICD-10-CM

## 2023-09-07 RX ORDER — LIDOCAINE HYDROCHLORIDE 10 MG/ML
4 INJECTION, SOLUTION INFILTRATION; PERINEURAL ONCE
Status: COMPLETED | OUTPATIENT
Start: 2023-09-07 | End: 2023-09-07

## 2023-09-07 RX ORDER — METHYLPREDNISOLONE ACETATE 40 MG/ML
80 INJECTION, SUSPENSION INTRA-ARTICULAR; INTRALESIONAL; INTRAMUSCULAR; SOFT TISSUE ONCE
Status: COMPLETED | OUTPATIENT
Start: 2023-09-07 | End: 2023-09-07

## 2023-09-07 RX ADMIN — LIDOCAINE HYDROCHLORIDE 4 ML: 10 INJECTION, SOLUTION INFILTRATION; PERINEURAL at 11:23

## 2023-09-07 RX ADMIN — METHYLPREDNISOLONE ACETATE 80 MG: 40 INJECTION, SUSPENSION INTRA-ARTICULAR; INTRALESIONAL; INTRAMUSCULAR; SOFT TISSUE at 11:23

## 2023-09-07 NOTE — PROGRESS NOTES
Chief Complaint  Shoulder Pain (CK L SHOULDER)        History of Present Illness: The patient is here for repeat evaluation of her left shoulder. She is also here for evaluation of a new problem regarding her left foot. The patient has not noted much improvement to her left shoulder after 1 glenohumeral injection and 1 subacromial injection as well as physical therapy. However she believes may be due in part to the fact that after her ileostomy reversal last month she has been cleared back to riding horses and doing more with her course care. Because of this she has been using her shoulder more. 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 In regards to the left foot, she has 150 pound great Juliano that stepped onto her left small toe last night. She is having a good amount of pain walking. Prior HPI 6/29/2023:  Patient here for repeat evaluation of her left shoulder. The patient underwent two surgeries for her abdomen and colon back in the spring which unfortunately was a difficult problem for her where she suffered a collapsed lung and required open colon resection. She currently has an ileostomy with planned reversal on July 20 in a few weeks. The patient reports that her left shoulder has not gotten much better. It continues to bother her. She got an MRI and is here to go over the results. She did undergo a steroid injection back in late January into the glenohumeral joint. Prior HPI 3/13/2023:  The patient is here for repeat evaluation of her left shoulder. The patient underwent a steroid injection 6 weeks ago into the glenohumeral joint. She reported some improvement from this but she still has some pain and some limitations in range of motion.   She also found out that she will need another surgery for her colon cancer as it is deeper than they thought and she is looking to have this

## 2023-11-02 ENCOUNTER — OFFICE VISIT (OUTPATIENT)
Dept: ORTHOPEDIC SURGERY | Age: 58
End: 2023-11-02
Payer: COMMERCIAL

## 2023-11-02 DIAGNOSIS — M75.02 ADHESIVE CAPSULITIS OF LEFT SHOULDER: Primary | ICD-10-CM

## 2023-11-02 PROCEDURE — 99213 OFFICE O/P EST LOW 20 MIN: CPT | Performed by: STUDENT IN AN ORGANIZED HEALTH CARE EDUCATION/TRAINING PROGRAM

## 2023-11-02 NOTE — PROGRESS NOTES
Chief Complaint  Shoulder Pain (Ck lt shoulder)        History of Present Illness: The patient is here for repeat evaluation of her left shoulder. She reported that the steroid shot from last visit did help a little bit but then it wore off again. The patient then went to a chiropractor and massage therapist who did what sounds like an awake manipulation to the shoulder. She reported a good amount of pain during the 30 minutes of doing it. However afterwards she noted near normal range of motion and much improved pain. She also reports that she went to tumor board and there going to wait another 2 months to see if it grows into a different area or not to determine the next steps. It is a slow-growing cancer. Prior HPI 9/7/2023:  The patient is here for repeat evaluation of her left shoulder. She is also here for evaluation of a new problem regarding her left foot. The patient has not noted much improvement to her left shoulder after 1 glenohumeral injection and 1 subacromial injection as well as physical therapy. However she believes may be due in part to the fact that after her ileostomy reversal last month she has been cleared back to riding horses and doing more with her course care. Because of this she has been using her shoulder more. 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 In regards to the left foot, she has 150 pound great Juliano that stepped onto her left small toe last night. She is having a good amount of pain walking. Prior HPI 6/29/2023:  Patient here for repeat evaluation of her left shoulder. The patient underwent two surgeries for her abdomen and colon back in the spring which unfortunately was a difficult problem for her where she suffered a collapsed lung and required open colon resection. She currently has an ileostomy with planned reversal on July 20 in a few weeks.

## 2024-08-18 ENCOUNTER — APPOINTMENT (OUTPATIENT)
Dept: GENERAL RADIOLOGY | Age: 59
DRG: 392 | End: 2024-08-18
Payer: COMMERCIAL

## 2024-08-18 ENCOUNTER — APPOINTMENT (OUTPATIENT)
Dept: CT IMAGING | Age: 59
DRG: 392 | End: 2024-08-18
Payer: COMMERCIAL

## 2024-08-18 ENCOUNTER — HOSPITAL ENCOUNTER (INPATIENT)
Age: 59
LOS: 3 days | Discharge: HOME | DRG: 392 | End: 2024-08-21
Attending: EMERGENCY MEDICINE | Admitting: STUDENT IN AN ORGANIZED HEALTH CARE EDUCATION/TRAINING PROGRAM
Payer: COMMERCIAL

## 2024-08-18 DIAGNOSIS — T45.1X5A CHEMOTHERAPY ADVERSE REACTION, INITIAL ENCOUNTER: ICD-10-CM

## 2024-08-18 DIAGNOSIS — R79.89 ELEVATED TROPONIN: ICD-10-CM

## 2024-08-18 DIAGNOSIS — R11.2 INTRACTABLE NAUSEA AND VOMITING: Primary | ICD-10-CM

## 2024-08-18 LAB
ALBUMIN SERPL-MCNC: 4.5 G/DL (ref 3.4–5)
ALBUMIN/GLOB SERPL: 1.4 {RATIO} (ref 1.1–2.2)
ALP SERPL-CCNC: 84 U/L (ref 40–129)
ALT SERPL-CCNC: 14 U/L (ref 10–40)
ANION GAP SERPL CALCULATED.3IONS-SCNC: 18 MMOL/L (ref 3–16)
AST SERPL-CCNC: 26 U/L (ref 15–37)
BASOPHILS # BLD: 0.3 K/UL (ref 0–0.2)
BASOPHILS NFR BLD: 4 %
BILIRUB SERPL-MCNC: 1 MG/DL (ref 0–1)
BILIRUB UR QL STRIP.AUTO: NEGATIVE
BUN SERPL-MCNC: 17 MG/DL (ref 7–20)
CALCIUM SERPL-MCNC: 10.3 MG/DL (ref 8.3–10.6)
CHLORIDE SERPL-SCNC: 96 MMOL/L (ref 99–110)
CLARITY UR: CLEAR
CO2 SERPL-SCNC: 20 MMOL/L (ref 21–32)
COLOR UR: YELLOW
CREAT SERPL-MCNC: 0.8 MG/DL (ref 0.6–1.2)
CRP SERPL-MCNC: 6.1 MG/L (ref 0–5.1)
DEPRECATED RDW RBC AUTO: 15.4 % (ref 12.4–15.4)
EOSINOPHIL # BLD: 0.1 K/UL (ref 0–0.6)
EOSINOPHIL NFR BLD: 1.6 %
GFR SERPLBLD CREATININE-BSD FMLA CKD-EPI: 84 ML/MIN/{1.73_M2}
GLUCOSE SERPL-MCNC: 114 MG/DL (ref 70–99)
GLUCOSE UR STRIP.AUTO-MCNC: NEGATIVE MG/DL
HCT VFR BLD AUTO: 38.8 % (ref 36–48)
HGB BLD-MCNC: 13.2 G/DL (ref 12–16)
HGB UR QL STRIP.AUTO: NEGATIVE
KETONES UR STRIP.AUTO-MCNC: 15 MG/DL
LACTATE BLDV-SCNC: 1 MMOL/L (ref 0.4–1.9)
LACTATE BLDV-SCNC: 2.8 MMOL/L (ref 0.4–1.9)
LEUKOCYTE ESTERASE UR QL STRIP.AUTO: NEGATIVE
LIPASE SERPL-CCNC: 18 U/L (ref 13–60)
LYMPHOCYTES # BLD: 0.8 K/UL (ref 1–5.1)
LYMPHOCYTES NFR BLD: 11.9 %
MAGNESIUM SERPL-MCNC: 2 MG/DL (ref 1.8–2.4)
MCH RBC QN AUTO: 31.4 PG (ref 26–34)
MCHC RBC AUTO-ENTMCNC: 34.1 G/DL (ref 31–36)
MCV RBC AUTO: 92 FL (ref 80–100)
MONOCYTES # BLD: 0.1 K/UL (ref 0–1.3)
MONOCYTES NFR BLD: 1.5 %
NEUTROPHILS # BLD: 5.7 K/UL (ref 1.7–7.7)
NEUTROPHILS NFR BLD: 81 %
NITRITE UR QL STRIP.AUTO: NEGATIVE
NT-PROBNP SERPL-MCNC: 171 PG/ML (ref 0–124)
PH UR STRIP.AUTO: 6 [PH] (ref 5–8)
PLATELET # BLD AUTO: 246 K/UL (ref 135–450)
PMV BLD AUTO: 10.4 FL (ref 5–10.5)
POTASSIUM SERPL-SCNC: 4.3 MMOL/L (ref 3.5–5.1)
PROCALCITONIN SERPL IA-MCNC: 0.06 NG/ML (ref 0–0.15)
PROT SERPL-MCNC: 7.8 G/DL (ref 6.4–8.2)
PROT UR STRIP.AUTO-MCNC: NEGATIVE MG/DL
RBC # BLD AUTO: 4.21 M/UL (ref 4–5.2)
SODIUM SERPL-SCNC: 134 MMOL/L (ref 136–145)
SP GR UR STRIP.AUTO: 1.01 (ref 1–1.03)
TROPONIN, HIGH SENSITIVITY: 34 NG/L (ref 0–14)
TROPONIN, HIGH SENSITIVITY: 36 NG/L (ref 0–14)
UA COMPLETE W REFLEX CULTURE PNL UR: ABNORMAL
UA DIPSTICK W REFLEX MICRO PNL UR: ABNORMAL
URN SPEC COLLECT METH UR: ABNORMAL
UROBILINOGEN UR STRIP-ACNC: 0.2 E.U./DL
WBC # BLD AUTO: 7.1 K/UL (ref 4–11)

## 2024-08-18 PROCEDURE — 71045 X-RAY EXAM CHEST 1 VIEW: CPT

## 2024-08-18 PROCEDURE — 87040 BLOOD CULTURE FOR BACTERIA: CPT

## 2024-08-18 PROCEDURE — 83735 ASSAY OF MAGNESIUM: CPT

## 2024-08-18 PROCEDURE — 96365 THER/PROPH/DIAG IV INF INIT: CPT

## 2024-08-18 PROCEDURE — 83690 ASSAY OF LIPASE: CPT

## 2024-08-18 PROCEDURE — 96374 THER/PROPH/DIAG INJ IV PUSH: CPT

## 2024-08-18 PROCEDURE — 6360000002 HC RX W HCPCS: Performed by: EMERGENCY MEDICINE

## 2024-08-18 PROCEDURE — 96375 TX/PRO/DX INJ NEW DRUG ADDON: CPT

## 2024-08-18 PROCEDURE — 81003 URINALYSIS AUTO W/O SCOPE: CPT

## 2024-08-18 PROCEDURE — 83880 ASSAY OF NATRIURETIC PEPTIDE: CPT

## 2024-08-18 PROCEDURE — 84484 ASSAY OF TROPONIN QUANT: CPT

## 2024-08-18 PROCEDURE — 2000000000 HC ICU R&B

## 2024-08-18 PROCEDURE — 6360000004 HC RX CONTRAST MEDICATION: Performed by: EMERGENCY MEDICINE

## 2024-08-18 PROCEDURE — 83605 ASSAY OF LACTIC ACID: CPT

## 2024-08-18 PROCEDURE — 36415 COLL VENOUS BLD VENIPUNCTURE: CPT

## 2024-08-18 PROCEDURE — 2580000003 HC RX 258: Performed by: EMERGENCY MEDICINE

## 2024-08-18 PROCEDURE — 85025 COMPLETE CBC W/AUTO DIFF WBC: CPT

## 2024-08-18 PROCEDURE — 80053 COMPREHEN METABOLIC PANEL: CPT

## 2024-08-18 PROCEDURE — 74177 CT ABD & PELVIS W/CONTRAST: CPT

## 2024-08-18 PROCEDURE — 96361 HYDRATE IV INFUSION ADD-ON: CPT

## 2024-08-18 PROCEDURE — 6370000000 HC RX 637 (ALT 250 FOR IP): Performed by: EMERGENCY MEDICINE

## 2024-08-18 PROCEDURE — 93005 ELECTROCARDIOGRAM TRACING: CPT | Performed by: EMERGENCY MEDICINE

## 2024-08-18 PROCEDURE — 96367 TX/PROPH/DG ADDL SEQ IV INF: CPT

## 2024-08-18 PROCEDURE — 86140 C-REACTIVE PROTEIN: CPT

## 2024-08-18 PROCEDURE — 71260 CT THORAX DX C+: CPT

## 2024-08-18 PROCEDURE — 84145 PROCALCITONIN (PCT): CPT

## 2024-08-18 PROCEDURE — 99285 EMERGENCY DEPT VISIT HI MDM: CPT

## 2024-08-18 PROCEDURE — 70486 CT MAXILLOFACIAL W/O DYE: CPT

## 2024-08-18 PROCEDURE — 96376 TX/PRO/DX INJ SAME DRUG ADON: CPT

## 2024-08-18 RX ORDER — BUPRENORPHINE 7.5 UG/H
1 PATCH TRANSDERMAL WEEKLY
COMMUNITY
Start: 2024-07-29 | End: 2024-08-26

## 2024-08-18 RX ORDER — ONDANSETRON 2 MG/ML
4 INJECTION INTRAMUSCULAR; INTRAVENOUS EVERY 8 HOURS PRN
Status: DISCONTINUED | OUTPATIENT
Start: 2024-08-18 | End: 2024-08-21 | Stop reason: HOSPADM

## 2024-08-18 RX ORDER — ACETAMINOPHEN 325 MG/1
650 TABLET ORAL EVERY 6 HOURS PRN
Status: DISCONTINUED | OUTPATIENT
Start: 2024-08-18 | End: 2024-08-21 | Stop reason: HOSPADM

## 2024-08-18 RX ORDER — METOCLOPRAMIDE HYDROCHLORIDE 5 MG/ML
5 INJECTION INTRAMUSCULAR; INTRAVENOUS
Status: COMPLETED | OUTPATIENT
Start: 2024-08-18 | End: 2024-08-18

## 2024-08-18 RX ORDER — DOXYCYCLINE HYCLATE 100 MG
100 TABLET ORAL 2 TIMES DAILY
COMMUNITY
Start: 2024-04-10

## 2024-08-18 RX ORDER — PROCHLORPERAZINE MALEATE 10 MG
10 TABLET ORAL EVERY 6 HOURS PRN
COMMUNITY
Start: 2024-06-13

## 2024-08-18 RX ORDER — 0.9 % SODIUM CHLORIDE 0.9 %
1000 INTRAVENOUS SOLUTION INTRAVENOUS ONCE
Status: COMPLETED | OUTPATIENT
Start: 2024-08-18 | End: 2024-08-18

## 2024-08-18 RX ORDER — DIPHENHYDRAMINE HYDROCHLORIDE 50 MG/ML
25 INJECTION INTRAMUSCULAR; INTRAVENOUS
Status: COMPLETED | OUTPATIENT
Start: 2024-08-18 | End: 2024-08-18

## 2024-08-18 RX ORDER — ONDANSETRON 2 MG/ML
4 INJECTION INTRAMUSCULAR; INTRAVENOUS ONCE
Status: COMPLETED | OUTPATIENT
Start: 2024-08-18 | End: 2024-08-18

## 2024-08-18 RX ORDER — MIDODRINE HYDROCHLORIDE 5 MG/1
10 TABLET ORAL
Status: DISCONTINUED | OUTPATIENT
Start: 2024-08-18 | End: 2024-08-18

## 2024-08-18 RX ORDER — MIDODRINE HYDROCHLORIDE 5 MG/1
10 TABLET ORAL ONCE
Status: COMPLETED | OUTPATIENT
Start: 2024-08-18 | End: 2024-08-18

## 2024-08-18 RX ORDER — ONDANSETRON HYDROCHLORIDE 8 MG/1
8 TABLET, FILM COATED ORAL EVERY 8 HOURS PRN
COMMUNITY
Start: 2024-06-13

## 2024-08-18 RX ORDER — ACETAMINOPHEN 650 MG/1
650 SUPPOSITORY RECTAL EVERY 6 HOURS PRN
Status: DISCONTINUED | OUTPATIENT
Start: 2024-08-18 | End: 2024-08-21 | Stop reason: HOSPADM

## 2024-08-18 RX ORDER — ENOXAPARIN SODIUM 100 MG/ML
40 INJECTION SUBCUTANEOUS DAILY
Status: DISCONTINUED | OUTPATIENT
Start: 2024-08-19 | End: 2024-08-21 | Stop reason: HOSPADM

## 2024-08-18 RX ADMIN — HYDROMORPHONE HYDROCHLORIDE 0.5 MG: 1 INJECTION, SOLUTION INTRAMUSCULAR; INTRAVENOUS; SUBCUTANEOUS at 17:29

## 2024-08-18 RX ADMIN — HYDROMORPHONE HYDROCHLORIDE 0.25 MG: 1 INJECTION, SOLUTION INTRAMUSCULAR; INTRAVENOUS; SUBCUTANEOUS at 16:44

## 2024-08-18 RX ADMIN — ONDANSETRON 4 MG: 2 INJECTION INTRAMUSCULAR; INTRAVENOUS at 20:47

## 2024-08-18 RX ADMIN — SODIUM CHLORIDE 1000 ML: 9 INJECTION, SOLUTION INTRAVENOUS at 18:02

## 2024-08-18 RX ADMIN — IOPAMIDOL 75 ML: 755 INJECTION, SOLUTION INTRAVENOUS at 20:35

## 2024-08-18 RX ADMIN — DIPHENHYDRAMINE HYDROCHLORIDE 25 MG: 50 INJECTION INTRAMUSCULAR; INTRAVENOUS at 22:41

## 2024-08-18 RX ADMIN — VANCOMYCIN HYDROCHLORIDE 1250 MG: 10 INJECTION, POWDER, LYOPHILIZED, FOR SOLUTION INTRAVENOUS at 20:50

## 2024-08-18 RX ADMIN — METOCLOPRAMIDE HYDROCHLORIDE 5 MG: 5 INJECTION INTRAMUSCULAR; INTRAVENOUS at 22:41

## 2024-08-18 RX ADMIN — ONDANSETRON 4 MG: 2 INJECTION INTRAMUSCULAR; INTRAVENOUS at 16:45

## 2024-08-18 RX ADMIN — CEFEPIME 2000 MG: 2 INJECTION, POWDER, FOR SOLUTION INTRAVENOUS at 19:31

## 2024-08-18 RX ADMIN — SODIUM CHLORIDE 1000 ML: 9 INJECTION, SOLUTION INTRAVENOUS at 16:51

## 2024-08-18 RX ADMIN — MIDODRINE HYDROCHLORIDE 10 MG: 5 TABLET ORAL at 17:57

## 2024-08-18 ASSESSMENT — LIFESTYLE VARIABLES
HOW OFTEN DO YOU HAVE A DRINK CONTAINING ALCOHOL: NEVER
HOW MANY STANDARD DRINKS CONTAINING ALCOHOL DO YOU HAVE ON A TYPICAL DAY: PATIENT DOES NOT DRINK

## 2024-08-18 ASSESSMENT — PAIN - FUNCTIONAL ASSESSMENT: PAIN_FUNCTIONAL_ASSESSMENT: 0-10

## 2024-08-18 ASSESSMENT — PAIN SCALES - GENERAL
PAINLEVEL_OUTOF10: 4
PAINLEVEL_OUTOF10: 0
PAINLEVEL_OUTOF10: 10
PAINLEVEL_OUTOF10: 8
PAINLEVEL_OUTOF10: 8

## 2024-08-18 ASSESSMENT — PAIN DESCRIPTION - LOCATION: LOCATION: ABDOMEN

## 2024-08-18 NOTE — CONSULTS
Pharmacy Note  Vancomycin Consult    Summer Joseph is a 59 y.o. female started on Vancomycin for Sepsis for a one-time dose in the ED; consult received from Dr. Gonzalez to manage therapy. Also receiving the following antibiotics: cefepime.    Allergies:  Percocet [oxycodone-acetaminophen] and Azithromycin     Tmax: 98.3  Recent Labs     08/18/24  1656   CREATININE 0.8     Recent Labs     08/18/24  1656   WBC 7.1     Estimated Creatinine Clearance: 65 mL/min (based on SCr of 0.8 mg/dL).  No intake or output data in the 24 hours ending 08/18/24 1816  Wt Readings from Last 1 Encounters:   08/18/24 59.4 kg (131 lb)       Body mass index is 22.49 kg/m².    Date Culture Results   8/18 Blood x2 ordered, yet to be drawn   8/18 Urine Reflex ordered, yet to be drawn     Assessment/Plan:  Will initiate Vancomycin with a one-time loading dose of 1250 mg x1    If you would like to continue vancomycin if/when patient is admitted, please re-consult pharmacy    Thank you for the consult,  Sushila Soto, PharmD 8/18/2024 6:16 PM

## 2024-08-19 LAB
ALBUMIN SERPL-MCNC: 3.4 G/DL (ref 3.4–5)
ALBUMIN/GLOB SERPL: 1.6 {RATIO} (ref 1.1–2.2)
ALP SERPL-CCNC: 56 U/L (ref 40–129)
ALT SERPL-CCNC: 9 U/L (ref 10–40)
ANION GAP SERPL CALCULATED.3IONS-SCNC: 7 MMOL/L (ref 3–16)
AST SERPL-CCNC: 13 U/L (ref 15–37)
BASOPHILS # BLD: 0 K/UL (ref 0–0.2)
BASOPHILS NFR BLD: 0 %
BILIRUB SERPL-MCNC: 0.5 MG/DL (ref 0–1)
BUN SERPL-MCNC: 15 MG/DL (ref 7–20)
CALCIUM SERPL-MCNC: 8.5 MG/DL (ref 8.3–10.6)
CHLORIDE SERPL-SCNC: 107 MMOL/L (ref 99–110)
CO2 SERPL-SCNC: 24 MMOL/L (ref 21–32)
CREAT SERPL-MCNC: 0.7 MG/DL (ref 0.6–1.2)
DEPRECATED RDW RBC AUTO: 15.4 % (ref 12.4–15.4)
EKG ATRIAL RATE: 120 BPM
EKG DIAGNOSIS: NORMAL
EKG P AXIS: 63 DEGREES
EKG P-R INTERVAL: 120 MS
EKG Q-T INTERVAL: 304 MS
EKG QRS DURATION: 74 MS
EKG QTC CALCULATION (BAZETT): 429 MS
EKG R AXIS: 67 DEGREES
EKG T AXIS: 46 DEGREES
EKG VENTRICULAR RATE: 120 BPM
EOSINOPHIL # BLD: 0.1 K/UL (ref 0–0.6)
EOSINOPHIL NFR BLD: 2 %
GFR SERPLBLD CREATININE-BSD FMLA CKD-EPI: >90 ML/MIN/{1.73_M2}
GLUCOSE SERPL-MCNC: 96 MG/DL (ref 70–99)
HCT VFR BLD AUTO: 28.3 % (ref 36–48)
HGB BLD-MCNC: 9.4 G/DL (ref 12–16)
LYMPHOCYTES # BLD: 0.6 K/UL (ref 1–5.1)
LYMPHOCYTES NFR BLD: 21 %
MAGNESIUM SERPL-MCNC: 1.9 MG/DL (ref 1.8–2.4)
MCH RBC QN AUTO: 31.1 PG (ref 26–34)
MCHC RBC AUTO-ENTMCNC: 33.3 G/DL (ref 31–36)
MCV RBC AUTO: 93.4 FL (ref 80–100)
METAMYELOCYTES NFR BLD MANUAL: 1 %
MONOCYTES # BLD: 0 K/UL (ref 0–1.3)
MONOCYTES NFR BLD: 1 %
NEUTROPHILS # BLD: 2.3 K/UL (ref 1.7–7.7)
NEUTROPHILS NFR BLD: 74 %
NEUTS BAND NFR BLD MANUAL: 1 % (ref 0–7)
PLATELET # BLD AUTO: 163 K/UL (ref 135–450)
PLATELET BLD QL SMEAR: ADEQUATE
PMV BLD AUTO: 10.3 FL (ref 5–10.5)
POTASSIUM SERPL-SCNC: 3.5 MMOL/L (ref 3.5–5.1)
PROT SERPL-MCNC: 5.5 G/DL (ref 6.4–8.2)
RBC # BLD AUTO: 3.03 M/UL (ref 4–5.2)
RBC MORPH BLD: NORMAL
SLIDE REVIEW: ABNORMAL
SODIUM SERPL-SCNC: 138 MMOL/L (ref 136–145)
WBC # BLD AUTO: 3 K/UL (ref 4–11)

## 2024-08-19 PROCEDURE — 2580000003 HC RX 258: Performed by: STUDENT IN AN ORGANIZED HEALTH CARE EDUCATION/TRAINING PROGRAM

## 2024-08-19 PROCEDURE — 2000000000 HC ICU R&B

## 2024-08-19 PROCEDURE — 97165 OT EVAL LOW COMPLEX 30 MIN: CPT

## 2024-08-19 PROCEDURE — 83735 ASSAY OF MAGNESIUM: CPT

## 2024-08-19 PROCEDURE — 6360000002 HC RX W HCPCS: Performed by: STUDENT IN AN ORGANIZED HEALTH CARE EDUCATION/TRAINING PROGRAM

## 2024-08-19 PROCEDURE — 97535 SELF CARE MNGMENT TRAINING: CPT

## 2024-08-19 PROCEDURE — 6370000000 HC RX 637 (ALT 250 FOR IP): Performed by: STUDENT IN AN ORGANIZED HEALTH CARE EDUCATION/TRAINING PROGRAM

## 2024-08-19 PROCEDURE — 85025 COMPLETE CBC W/AUTO DIFF WBC: CPT

## 2024-08-19 PROCEDURE — 6370000000 HC RX 637 (ALT 250 FOR IP): Performed by: INTERNAL MEDICINE

## 2024-08-19 PROCEDURE — 93010 ELECTROCARDIOGRAM REPORT: CPT | Performed by: INTERNAL MEDICINE

## 2024-08-19 PROCEDURE — 97116 GAIT TRAINING THERAPY: CPT

## 2024-08-19 PROCEDURE — 80053 COMPREHEN METABOLIC PANEL: CPT

## 2024-08-19 PROCEDURE — 97161 PT EVAL LOW COMPLEX 20 MIN: CPT

## 2024-08-19 RX ORDER — SODIUM CHLORIDE 9 MG/ML
INJECTION, SOLUTION INTRAVENOUS PRN
Status: DISCONTINUED | OUTPATIENT
Start: 2024-08-19 | End: 2024-08-21 | Stop reason: HOSPADM

## 2024-08-19 RX ORDER — SODIUM CHLORIDE 0.9 % (FLUSH) 0.9 %
5-40 SYRINGE (ML) INJECTION EVERY 12 HOURS SCHEDULED
Status: DISCONTINUED | OUTPATIENT
Start: 2024-08-19 | End: 2024-08-21 | Stop reason: HOSPADM

## 2024-08-19 RX ORDER — BUPRENORPHINE 7.5 UG/H
1 PATCH TRANSDERMAL WEEKLY
Status: DISCONTINUED | OUTPATIENT
Start: 2024-08-22 | End: 2024-08-21 | Stop reason: HOSPADM

## 2024-08-19 RX ORDER — METOCLOPRAMIDE 10 MG/1
10 TABLET ORAL
Status: DISCONTINUED | OUTPATIENT
Start: 2024-08-19 | End: 2024-08-21 | Stop reason: HOSPADM

## 2024-08-19 RX ORDER — SODIUM CHLORIDE 0.9 % (FLUSH) 0.9 %
5-40 SYRINGE (ML) INJECTION PRN
Status: DISCONTINUED | OUTPATIENT
Start: 2024-08-19 | End: 2024-08-21 | Stop reason: HOSPADM

## 2024-08-19 RX ORDER — PROCHLORPERAZINE MALEATE 5 MG/1
10 TABLET ORAL EVERY 6 HOURS PRN
Status: DISCONTINUED | OUTPATIENT
Start: 2024-08-19 | End: 2024-08-21 | Stop reason: HOSPADM

## 2024-08-19 RX ORDER — DOXYCYCLINE HYCLATE 100 MG
100 TABLET ORAL 2 TIMES DAILY
Status: DISCONTINUED | OUTPATIENT
Start: 2024-08-19 | End: 2024-08-21 | Stop reason: HOSPADM

## 2024-08-19 RX ORDER — LEVOTHYROXINE SODIUM 0.1 MG/1
100 TABLET ORAL DAILY
Status: DISCONTINUED | OUTPATIENT
Start: 2024-08-19 | End: 2024-08-21 | Stop reason: HOSPADM

## 2024-08-19 RX ORDER — SODIUM CHLORIDE, SODIUM LACTATE, POTASSIUM CHLORIDE, CALCIUM CHLORIDE 600; 310; 30; 20 MG/100ML; MG/100ML; MG/100ML; MG/100ML
INJECTION, SOLUTION INTRAVENOUS CONTINUOUS
Status: ACTIVE | OUTPATIENT
Start: 2024-08-19 | End: 2024-08-19

## 2024-08-19 RX ORDER — MECLIZINE HCL 12.5 MG/1
25 TABLET ORAL 3 TIMES DAILY PRN
Status: DISCONTINUED | OUTPATIENT
Start: 2024-08-19 | End: 2024-08-21 | Stop reason: HOSPADM

## 2024-08-19 RX ORDER — MIDODRINE HYDROCHLORIDE 5 MG/1
10 TABLET ORAL 3 TIMES DAILY
Status: DISCONTINUED | OUTPATIENT
Start: 2024-08-19 | End: 2024-08-21

## 2024-08-19 RX ORDER — DULOXETIN HYDROCHLORIDE 60 MG/1
60 CAPSULE, DELAYED RELEASE ORAL DAILY
Status: DISCONTINUED | OUTPATIENT
Start: 2024-08-19 | End: 2024-08-21 | Stop reason: HOSPADM

## 2024-08-19 RX ADMIN — MIDODRINE HYDROCHLORIDE 10 MG: 5 TABLET ORAL at 13:42

## 2024-08-19 RX ADMIN — MUPIROCIN: 20 OINTMENT TOPICAL at 20:47

## 2024-08-19 RX ADMIN — ACETAMINOPHEN 650 MG: 325 TABLET ORAL at 14:18

## 2024-08-19 RX ADMIN — ONDANSETRON 4 MG: 2 INJECTION INTRAMUSCULAR; INTRAVENOUS at 05:11

## 2024-08-19 RX ADMIN — SODIUM CHLORIDE, POTASSIUM CHLORIDE, SODIUM LACTATE AND CALCIUM CHLORIDE: 600; 310; 30; 20 INJECTION, SOLUTION INTRAVENOUS at 05:17

## 2024-08-19 RX ADMIN — METOCLOPRAMIDE 10 MG: 10 TABLET ORAL at 16:16

## 2024-08-19 RX ADMIN — PROCHLORPERAZINE MALEATE 10 MG: 5 TABLET ORAL at 20:49

## 2024-08-19 RX ADMIN — METOCLOPRAMIDE 10 MG: 10 TABLET ORAL at 10:59

## 2024-08-19 RX ADMIN — METOCLOPRAMIDE 10 MG: 10 TABLET ORAL at 05:11

## 2024-08-19 RX ADMIN — DOXYCYCLINE HYCLATE 100 MG: 100 TABLET, COATED ORAL at 20:47

## 2024-08-19 RX ADMIN — ENOXAPARIN SODIUM 40 MG: 100 INJECTION SUBCUTANEOUS at 08:22

## 2024-08-19 RX ADMIN — MIDODRINE HYDROCHLORIDE 10 MG: 5 TABLET ORAL at 08:22

## 2024-08-19 RX ADMIN — MUPIROCIN: 20 OINTMENT TOPICAL at 08:23

## 2024-08-19 RX ADMIN — MIDODRINE HYDROCHLORIDE 10 MG: 5 TABLET ORAL at 20:46

## 2024-08-19 RX ADMIN — LEVOTHYROXINE SODIUM 100 MCG: 0.1 TABLET ORAL at 05:11

## 2024-08-19 RX ADMIN — DULOXETINE HYDROCHLORIDE 60 MG: 60 CAPSULE, DELAYED RELEASE ORAL at 08:22

## 2024-08-19 RX ADMIN — DOXYCYCLINE HYCLATE 100 MG: 100 TABLET, COATED ORAL at 08:22

## 2024-08-19 ASSESSMENT — PAIN SCALES - GENERAL
PAINLEVEL_OUTOF10: 3
PAINLEVEL_OUTOF10: 0

## 2024-08-19 ASSESSMENT — PAIN DESCRIPTION - LOCATION: LOCATION: HEAD

## 2024-08-19 NOTE — ACP (ADVANCE CARE PLANNING)
Advance Care Planning     General Advance Care Planning (ACP) Conversation    Date of Conversation: 8/19/2024  Conducted with: Patient with Decision Making Capacity  Other persons present: None    Healthcare Decision Maker:   Primary Decision Maker: Amara Alex - Child - [phone]    Secondary Decision Maker: JAMEL ALEX - Parent - [phone]       Content/Action Overview:  Has ACP document(s) NOT on file - requested patient to provide  Reviewed DNR/DNI and patient elects Full Code (Attempt Resuscitation)        Length of Voluntary ACP Conversation in minutes:  <16 minutes (Non-Billable)    Felicity Cain RN

## 2024-08-19 NOTE — CARE COORDINATION
Case Management Assessment  Initial Evaluation    Date/Time of Evaluation: 8/19/2024 3:09 PM  Assessment Completed by: Felicity Cain RN    If patient is discharged prior to next notation, then this note serves as note for discharge by case management.    Patient Name: Summer Joseph                   YOB: 1965  Diagnosis: Elevated troponin [R79.89]  Intractable nausea and vomiting [R11.2]  Chemotherapy adverse reaction, initial encounter [T45.1X5A]                   Date / Time: 8/18/2024  4:19 PM    Patient Admission Status: Inpatient   Readmission Risk (Low < 19, Mod (19-27), High > 27): Readmission Risk Score: 11.6    Current PCP: Liborio Rosales MD  PCP verified by CM? Yes    Chart Reviewed: No      History Provided by: Patient  Patient Orientation: Alert and Oriented, Person, Place, Situation, Self    Patient Cognition: Alert    Hospitalization in the last 30 days (Readmission):  No    If yes, Readmission Assessment in  Navigator will be completed.    Advance Directives:      Code Status: Full Code   Patient's Primary Decision Maker is: Patient Declined (Legal Next of Kin Remains as Decision Maker)    Primary Decision Maker: AlexAmara - Child - [phone]    Secondary Decision Maker: ALEXJAMEL - Parent - [phone]    Discharge Planning:    Patient lives with: Spouse/Significant Other Type of Home: House  Primary Care Giver: Self  Patient Support Systems include: Spouse/Significant Other, Children, Family Members   Current Financial resources: Other (Comment) (BCBS)  Current community resources: None  Current services prior to admission: Other (Comment) ([facility])            Current DME:              Type of Home Care services:  None    ADLS  Prior functional level: Independent in ADLs/IADLs  Current functional level: Independent in ADLs/IADLs    PT AM-PAC:   /24  OT AM-PAC: 24 /24    Family can provide assistance at DC: Yes  Would you like Case Management to discuss the

## 2024-08-19 NOTE — ED PROVIDER NOTES
Courtney Cullen MD   estradiol (CLIMARA) 0.05 MG/24HR Place 1 patch onto the skin    Courtney Cullen MD   desmopressin (DDAVP) 0.1 MG tablet Take 0.1 mg by mouth 3 times daily.    Courtney Cullen MD   Levothyroxine Sodium (SYNTHROID PO) Take 75 mcg by mouth daily.    Courtney Cullen MD   Cyanocobalamin (VITAMIN B 12 PO) Take  by mouth daily.      Courtney Cullen MD   vitamin D (CHOLECALCIFEROL) 400 UNIT TABS tablet Take 400 Units by mouth daily.      Courtney Cullen MD       Social history:   Social History     Tobacco Use    Smoking status: Never    Smokeless tobacco: Never   Substance Use Topics    Alcohol use: Yes     Alcohol/week: 2.0 standard drinks of alcohol     Types: 2 Glasses of wine per week     Comment: 2 X WEEK    Drug use: No       Family history:    Family History   Problem Relation Age of Onset    Cancer Father     Asthma Mother     High Cholesterol Paternal Grandmother     Stroke Paternal Grandmother     Cancer Paternal Grandfather        Allergies:   Allergies   Allergen Reactions    Percocet [Oxycodone-Acetaminophen] Itching     Tolerates plain Acetaminophen.    Azithromycin Nausea And Vomiting       PMH, Surgical Hx, FH, Social Hx reviewed by myself.   Patient's care impacted by the above conditions.    REVIEW OF SYSTEMS :      Review of Systems  10 systems reviewed, pertinent positives per HPI otherwise noted to be negative.      SCREENINGS                         CIWA Assessment  BP: 103/64  Pulse: 87           PHYSICAL EXAM  1 or more Elements     ED Triage Vitals 08/18/24 1623   BP Systolic BP Percentile Diastolic BP Percentile Temp Temp Source Pulse Respirations SpO2   (!) 113/94 -- -- 97.9 °F (36.6 °C) Oral (!) 116 16 98 %      Height Weight - Scale         1.626 m (5' 4\") 59.4 kg (131 lb)             GENERAL APPEARANCE: Awake and alert.  Afebrile.  Chronically ill-appearing.  Dry heaving  HENT: Normocephalic. Atraumatic. EOMI. No facial droop.

## 2024-08-19 NOTE — H&P
History and Physical      Name:  Summer Joseph /Age/Sex: 1965  (59 y.o. female)   MRN & CSN:  [id number] & [id number] Encounter Date/Time:2024  9:59 PM EDT   Location:  [facility]0234-01 PCP: Liborio Rosales MD       Assessment and Plan:   Summer Joseph is a 59 y.o. 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    Chemotherapy induced nausea and vomiting  Lipase level 18  CT chest abdomen pelvis no acute PE no acute intra-abdominal pathology or evident metastatic disease  Continue home Reglan  Compazine and Zofran as needed in case of persistent symptoms can utilize Haldol  IV LR infusion, clear liquid diet advance as tolerated    Hypotension  Probably related to multiple doses of Dilaudid, unable to tolerate home midodrine  Downtrending lactic acid 2.8>1 no other evidence of endorgan hypoperfusion.  Low inflammatory markers procalcitonin 0.06/CRP 6.1 low suspicion for infection  Monitor in ICU in case vasoactive agents are required  Resume home midodrine 10 mg 3 times daily  IV on infusion as above    Left parotid adenocarcinoma status post left parotidectomy  CT facial reviewed no evidence of abscess deep-seated infection or cellulitis  Continue home doxycycline for possible perichondritis  Follow-up outpatient with oncology    Hypothyroidism  Continue home Synthroid    Inpatient ICU with telemetry  Full code    Disposition:     Current Living situation: Home  Expected Disposition: Home   Estimated D/C: 2 days    Diet ADULT DIET; Clear Liquid   DVT Prophylaxis [x] Lovenox, []  Heparin, [] SCDs, [] Ambulation,  [] Eliquis, [] Xarelto, [] Coumadin   Code Status Full Code   Surrogate Decision Maker/ SHERIE Maloney     Personally reviewed Lab Studies and Imaging   Discussed management of the case with ER provider who recommended admission due to hypotension,

## 2024-08-20 LAB
ANION GAP SERPL CALCULATED.3IONS-SCNC: 7 MMOL/L (ref 3–16)
BUN SERPL-MCNC: 9 MG/DL (ref 7–20)
CALCIUM SERPL-MCNC: 8.9 MG/DL (ref 8.3–10.6)
CHLORIDE SERPL-SCNC: 103 MMOL/L (ref 99–110)
CO2 SERPL-SCNC: 28 MMOL/L (ref 21–32)
CREAT SERPL-MCNC: 0.7 MG/DL (ref 0.6–1.2)
DEPRECATED RDW RBC AUTO: 15 % (ref 12.4–15.4)
GFR SERPLBLD CREATININE-BSD FMLA CKD-EPI: >90 ML/MIN/{1.73_M2}
GLUCOSE SERPL-MCNC: 89 MG/DL (ref 70–99)
HCT VFR BLD AUTO: 28.6 % (ref 36–48)
HGB BLD-MCNC: 9.7 G/DL (ref 12–16)
MAGNESIUM SERPL-MCNC: 1.8 MG/DL (ref 1.8–2.4)
MCH RBC QN AUTO: 31.1 PG (ref 26–34)
MCHC RBC AUTO-ENTMCNC: 33.8 G/DL (ref 31–36)
MCV RBC AUTO: 92.2 FL (ref 80–100)
PLATELET # BLD AUTO: 160 K/UL (ref 135–450)
PMV BLD AUTO: 9.9 FL (ref 5–10.5)
POTASSIUM SERPL-SCNC: 3.9 MMOL/L (ref 3.5–5.1)
RBC # BLD AUTO: 3.1 M/UL (ref 4–5.2)
SODIUM SERPL-SCNC: 138 MMOL/L (ref 136–145)
WBC # BLD AUTO: 2.1 K/UL (ref 4–11)

## 2024-08-20 PROCEDURE — 6360000002 HC RX W HCPCS: Performed by: INTERNAL MEDICINE

## 2024-08-20 PROCEDURE — 85027 COMPLETE CBC AUTOMATED: CPT

## 2024-08-20 PROCEDURE — 2580000003 HC RX 258: Performed by: INTERNAL MEDICINE

## 2024-08-20 PROCEDURE — 80048 BASIC METABOLIC PNL TOTAL CA: CPT

## 2024-08-20 PROCEDURE — 6370000000 HC RX 637 (ALT 250 FOR IP): Performed by: INTERNAL MEDICINE

## 2024-08-20 PROCEDURE — 2060000000 HC ICU INTERMEDIATE R&B

## 2024-08-20 PROCEDURE — 6360000002 HC RX W HCPCS: Performed by: STUDENT IN AN ORGANIZED HEALTH CARE EDUCATION/TRAINING PROGRAM

## 2024-08-20 PROCEDURE — 83735 ASSAY OF MAGNESIUM: CPT

## 2024-08-20 PROCEDURE — 6370000000 HC RX 637 (ALT 250 FOR IP): Performed by: STUDENT IN AN ORGANIZED HEALTH CARE EDUCATION/TRAINING PROGRAM

## 2024-08-20 RX ORDER — MAGNESIUM SULFATE 1 G/100ML
1000 INJECTION INTRAVENOUS ONCE
Status: COMPLETED | OUTPATIENT
Start: 2024-08-20 | End: 2024-08-20

## 2024-08-20 RX ADMIN — LEVOTHYROXINE SODIUM 100 MCG: 0.1 TABLET ORAL at 06:04

## 2024-08-20 RX ADMIN — METOCLOPRAMIDE 10 MG: 10 TABLET ORAL at 06:04

## 2024-08-20 RX ADMIN — MAGNESIUM SULFATE HEPTAHYDRATE 1000 MG: 1 INJECTION, SOLUTION INTRAVENOUS at 09:13

## 2024-08-20 RX ADMIN — ENOXAPARIN SODIUM 40 MG: 100 INJECTION SUBCUTANEOUS at 08:58

## 2024-08-20 RX ADMIN — SODIUM CHLORIDE, PRESERVATIVE FREE 10 ML: 5 INJECTION INTRAVENOUS at 20:38

## 2024-08-20 RX ADMIN — MIDODRINE HYDROCHLORIDE 10 MG: 5 TABLET ORAL at 08:58

## 2024-08-20 RX ADMIN — PROCHLORPERAZINE MALEATE 10 MG: 5 TABLET ORAL at 08:58

## 2024-08-20 RX ADMIN — DOXYCYCLINE HYCLATE 100 MG: 100 TABLET, COATED ORAL at 20:37

## 2024-08-20 RX ADMIN — MIDODRINE HYDROCHLORIDE 10 MG: 5 TABLET ORAL at 16:08

## 2024-08-20 RX ADMIN — MUPIROCIN: 20 OINTMENT TOPICAL at 20:38

## 2024-08-20 RX ADMIN — MUPIROCIN: 20 OINTMENT TOPICAL at 08:59

## 2024-08-20 RX ADMIN — MIDODRINE HYDROCHLORIDE 10 MG: 5 TABLET ORAL at 20:37

## 2024-08-20 RX ADMIN — METOCLOPRAMIDE 10 MG: 10 TABLET ORAL at 12:12

## 2024-08-20 RX ADMIN — SODIUM CHLORIDE, PRESERVATIVE FREE 10 ML: 5 INJECTION INTRAVENOUS at 20:37

## 2024-08-20 RX ADMIN — DULOXETINE HYDROCHLORIDE 60 MG: 60 CAPSULE, DELAYED RELEASE ORAL at 08:58

## 2024-08-20 RX ADMIN — SODIUM CHLORIDE, PRESERVATIVE FREE 10 ML: 5 INJECTION INTRAVENOUS at 09:14

## 2024-08-20 RX ADMIN — METOCLOPRAMIDE 10 MG: 10 TABLET ORAL at 16:08

## 2024-08-20 RX ADMIN — DOXYCYCLINE HYCLATE 100 MG: 100 TABLET, COATED ORAL at 08:57

## 2024-08-20 ASSESSMENT — PAIN SCALES - GENERAL
PAINLEVEL_OUTOF10: 0
PAINLEVEL_OUTOF10: 0

## 2024-08-20 NOTE — PROGRESS NOTES
Hospital Medicine Progress Note      Date of Admission: 8/18/2024  Hospital Day: 2    Chief Admission Complaint:   Nausea and vomiting     Subjective: She is sitting up in bed, feels a little better today but still occasionally nauseated.  Feels weak and tired.  Has not vomited since last night    Presenting Admission History:       59 y.o. 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 she does follow with oncology at the [facility] and last received chemotherapy last Thursday.  RT, depression, hypothyroidism, hypertension presented from home with nausea and vomiting.       Assessment/Plan:      Current Principal Problem:  Intractable nausea and vomiting     nausea and vomiting : Suspect chemotherapy-induced.  She had her last chemo at the [facility] last Thursday.  She states she often does get nausea vomiting post chemo  Lipase level 18  CT chest abdomen pelvis no acute PE no acute intra-abdominal pathology or evident metastatic disease  Continue home Reglan  Compazine and Zofran as needed in case of persistent symptoms can utilize Haldol  IV LR infusion, encourage oral intake and will follow     Hypotension  Probably related to multiple doses of Dilaudid, unable to tolerate home midodrine  Downtrending lactic acid 2.8>1 no other evidence of endorgan hypoperfusion.  Low inflammatory markers procalcitonin 0.06/CRP 6.1 low suspicion for infection  Monitor in ICU in case vasoactive agents are required  Resume home midodrine 10 mg 3 times daily  IV on infusion as above     Left parotid adenocarcinoma status post left parotidectomy  CT facial reviewed no evidence of abscess deep-seated infection or cellulitis  Continue home doxycycline for possible perichondritis  Follow-up outpatient with oncology     Hypothyroidism  Continue home Synthroid    Physical Exam Performed:      General appearance: She is sitting up in bed, looks weak and frail but no apparent 
OK to access port if needed per Dr Gonzalez. PIV established and will use that for now.  
Occupational Therapy  Facility/Department: [facility] C2 CARD TELEMETRY  Occupational Therapy Initial Assessment, Treatment, and Discharge    Name: Summer Joseph  : 1965  MRN: [id number]  Date of Service: 2024    Discharge Recommendations:  Home independently  OT Equipment Recommendations  Equipment Needed: No     If pt is unable to be seen after this session, please let this note serve as discharge summary.  Please see case management note for discharge disposition.  Thank you.    Patient Diagnosis(es): The primary encounter diagnosis was Intractable nausea and vomiting. Diagnoses of Chemotherapy adverse reaction, initial encounter and Elevated troponin were also pertinent to this visit.  Past Medical History:  has a past medical history of Closed traumatic brain injury (HCC), Diabetes insipidus (HCC), Diverticulitis, MRSA nasal colonization, Nausea & vomiting, Needs pre-anesthesia assessment, Seizure (HCC), and Thyroid disease.  Past Surgical History:  has a past surgical history that includes  section (,,); Wrist surgery (); knee surgery (); Colon surgery (); [place] tooth extraction; Knee arthroscopy (2011); and incision and drainage (Left, 3/11/2019).           Assessment   Assessment: Pt is 60 yo female presenting from home w/ spouse, c/o intractable nausea and vomiting following recent chemotherapy tx. PLOF IND with all ADLs and mobility w/o AD. Pt is functioning near baseline this date, performing bed mobility mod I, transfers IND, and mobility w/ SPV and no AD. She completed LB dressing, standing grooming tasks, and toileting tasks IND. Pt states she has no concerns about returning home at d/c. Pt has no further acute OT needs at this time, will sign off. Pt safe to d/c home IND ( home to assist as needed in evenings) upon d/c.     Prognosis: Good  Decision Making: Low Complexity  No Skilled OT: Independent with ADL's;Independent with functional 
Patient admitted to ICU bed 234 from ED bed 2. Patient oriented to room, call light, bed rails, phone, lights and bathroom. Patient instructed about the schedule of the day including: vital sign frequency, lab draws, possible tests, frequency of MD and staff rounds, daily weights, I &O's and prescribed diet. Bedside monitor in place, patient aware of placement and reason. Bed locked, in lowest position, side rails up 2/4, call light within reach.       4 Eyes Skin Assessment     The patient is being assess for   Admission    I agree that 2 RN's have performed a thorough Head to Toe Skin Assessment on the patient. ALL assessment sites listed below have been assessed.      Areas assessed for pressure by both nurses:   [x]   Head, Face, and Ears   [x]   Shoulders, Back, and Chest, Abdomen  [x]   Arms, Elbows, and Hands   [x]   Coccyx, Sacrum, and Ischium  [x]   Legs, Feet, and Heels        Skin Assessed Under all Medical Devices by both nurses:  N/a               All Mepilex Borders were peeled back and area peeked at by both nurses:  No: n/a  Please list where Mepilex Borders are located:  n/a             **SHARE this note so that the co-signing nurse is able to place an eSignature**    Co-signer eSignature: Electronically signed by Darshan Horton RN on 8/18/24 at 11:38 PM EDT    Does the Patient have Skin Breakdown related to pressure?  No   Josh Prevention initiated:  No   Wound Care Orders initiated:  No      [facility] nurse consulted for Pressure Injury (Stage 3,4, Unstageable, DTI, NWPT, Complex wounds)and New or Established Ostomies:  No      Primary Nurse eSignature: Electronically signed by Negar Padron RN on 8/18/24 at 11:27 PM EDT       
monitoring of coagulation factors  [] Other -   [] Change in code status:    [] Decision to escalate care:    [] Major surgery/procedure with associated risk factors:    ----------------------------------------------------------------------  C. Data (any 2)  [x] Discussed current management and discharge planning options with Case Management.  [] Discussed management of the case with:    [x] Telemetry personally reviewed and interpreted as documented above    [] Imaging personally reviewed and interpreted, includes:    [x] Data Review (any 3)  [x] All available Consultant notes from yesterday/today were reviewed  [x] All current labs were reviewed and interpreted for clinical significance   [x] Appropriate follow-up labs were ordered  [] Collateral history obtained from:        Medications:  Personally reviewed in detail in conjunction w/ labs as documented for evidence of drug toxicity.     Infusion Medications    sodium chloride       Scheduled Medications    [START ON 8/22/2024] buprenorphine  1 patch TransDERmal Weekly    doxycycline hyclate  100 mg Oral BID    DULoxetine  60 mg Oral Daily    levothyroxine  100 mcg Oral Daily    metoclopramide  10 mg Oral TID AC    midodrine  10 mg Oral TID    mupirocin   Each Nostril BID    sodium chloride flush  5-40 mL IntraVENous 2 times per day    enoxaparin  40 mg SubCUTAneous Daily     PRN Meds: meclizine, prochlorperazine, sodium chloride flush, sodium chloride, acetaminophen **OR** acetaminophen, HYDROmorphone, ondansetron     Labs:  Personally reviewed and interpreted for clinical significance.     Recent Labs     08/18/24 1656 08/19/24  0510 08/20/24  0450   WBC 7.1 3.0* 2.1*   HGB 13.2 9.4* 9.7*   HCT 38.8 28.3* 28.6*    163 160     Recent Labs     08/18/24 1656 08/19/24  0510 08/20/24  0450   * 138 138   K 4.3 3.5 3.9   CL 96* 107 103   CO2 20* 24 28   BUN 17 15 9   CREATININE 0.8 0.7 0.7   CALCIUM 10.3 8.5 8.9   MG 2.00 1.90 1.80     Recent Labs     
modified(I) - MET 8/19/24  Short Term Goal 2: Pt will transfer sit <-> stand with (I) - MET 8/19/24  Short Term Goal 3: Pt will ambulate x 60 feet without AD with (I) - MET 8/19/24  Patient Goals   Patient Goals : \"To go home\"       Education  Patient Education  Education Given To: Patient  Education Provided: Role of Therapy;Plan of Care;Precautions;Transfer Training;Fall Prevention Strategies  Education Provided Comments: Pt educated on role of PT in acute setting and benefits of regular OOB activity in order to maintain/improve strength and lessen likelihood of developing hospital-acquired weakness; pt verbalizes understanding.  Education Method: Demonstration;Verbal  Barriers to Learning: None  Education Outcome: Verbalized understanding;Demonstrated understanding      Therapy Time   Individual Concurrent Group Co-treatment   Time In 0923         Time Out 0943         Minutes 20         Timed Code Treatment Minutes: 10 Minutes (10 minute eval + 10 minutes treatment)       Keisha Meek, PT, DPT #[id number]

## 2024-08-20 NOTE — PLAN OF CARE
Problem: Pain  Goal: Verbalizes/displays adequate comfort level or baseline comfort level  8/19/2024 2106 by Yaneli Nguyen RN  Outcome: Progressing  8/19/2024 1403 by Kaity Emmanuel RN  Outcome: Progressing     Problem: Safety - Adult  Goal: Free from fall injury  8/19/2024 2106 by Yaneli Nguyen, RN  Outcome: Progressing  8/19/2024 1403 by Kaity Emmanuel, RN  Outcome: Progressing

## 2024-08-20 NOTE — PLAN OF CARE
Problem: Discharge Planning  Goal: Discharge to home or other facility with appropriate resources  Outcome: Progressing  Flowsheets (Taken 8/20/2024 0801)  Discharge to home or other facility with appropriate resources:   Identify barriers to discharge with patient and caregiver   Arrange for needed discharge resources and transportation as appropriate   Identify discharge learning needs (meds, wound care, etc)     Problem: Pain  Goal: Verbalizes/displays adequate comfort level or baseline comfort level  8/20/2024 1059 by Evelina Waldrop RN  Outcome: Progressing  8/19/2024 2106 by Yaneli Nguyen RN  Outcome: Progressing     Problem: Safety - Adult  Goal: Free from fall injury  8/20/2024 1059 by Evelina Wladrop RN  Outcome: Progressing  Flowsheets (Taken 8/20/2024 1045)  Free From Fall Injury: Instruct family/caregiver on patient safety  8/19/2024 2106 by Yaneli Nguyen RN  Outcome: Progressing     Problem: Skin/Tissue Integrity  Goal: Absence of new skin breakdown  Description: 1.  Monitor for areas of redness and/or skin breakdown  2.  Assess vascular access sites hourly  3.  Every 4-6 hours minimum:  Change oxygen saturation probe site  4.  Every 4-6 hours:  If on nasal continuous positive airway pressure, respiratory therapy assess nares and determine need for appliance change or resting period.  Outcome: Progressing

## 2024-08-21 VITALS
TEMPERATURE: 98 F | HEIGHT: 64 IN | SYSTOLIC BLOOD PRESSURE: 101 MMHG | RESPIRATION RATE: 11 BRPM | BODY MASS INDEX: 24.09 KG/M2 | DIASTOLIC BLOOD PRESSURE: 46 MMHG | OXYGEN SATURATION: 98 % | HEART RATE: 50 BPM | WEIGHT: 141.09 LBS

## 2024-08-21 LAB
ANION GAP SERPL CALCULATED.3IONS-SCNC: 7 MMOL/L (ref 3–16)
BUN SERPL-MCNC: 12 MG/DL (ref 7–20)
CALCIUM SERPL-MCNC: 9.4 MG/DL (ref 8.3–10.6)
CHLORIDE SERPL-SCNC: 101 MMOL/L (ref 99–110)
CO2 SERPL-SCNC: 29 MMOL/L (ref 21–32)
CREAT SERPL-MCNC: 0.8 MG/DL (ref 0.6–1.2)
DEPRECATED RDW RBC AUTO: 15.1 % (ref 12.4–15.4)
GFR SERPLBLD CREATININE-BSD FMLA CKD-EPI: 84 ML/MIN/{1.73_M2}
GLUCOSE SERPL-MCNC: 94 MG/DL (ref 70–99)
HCT VFR BLD AUTO: 27.9 % (ref 36–48)
HGB BLD-MCNC: 9.5 G/DL (ref 12–16)
MAGNESIUM SERPL-MCNC: 1.9 MG/DL (ref 1.8–2.4)
MCH RBC QN AUTO: 31.3 PG (ref 26–34)
MCHC RBC AUTO-ENTMCNC: 34.1 G/DL (ref 31–36)
MCV RBC AUTO: 91.8 FL (ref 80–100)
PLATELET # BLD AUTO: 169 K/UL (ref 135–450)
PMV BLD AUTO: 9.8 FL (ref 5–10.5)
POTASSIUM SERPL-SCNC: 3.9 MMOL/L (ref 3.5–5.1)
RBC # BLD AUTO: 3.04 M/UL (ref 4–5.2)
SODIUM SERPL-SCNC: 137 MMOL/L (ref 136–145)
WBC # BLD AUTO: 2.8 K/UL (ref 4–11)

## 2024-08-21 PROCEDURE — 83735 ASSAY OF MAGNESIUM: CPT

## 2024-08-21 PROCEDURE — 6360000002 HC RX W HCPCS: Performed by: STUDENT IN AN ORGANIZED HEALTH CARE EDUCATION/TRAINING PROGRAM

## 2024-08-21 PROCEDURE — 6370000000 HC RX 637 (ALT 250 FOR IP): Performed by: STUDENT IN AN ORGANIZED HEALTH CARE EDUCATION/TRAINING PROGRAM

## 2024-08-21 PROCEDURE — 85027 COMPLETE CBC AUTOMATED: CPT

## 2024-08-21 PROCEDURE — 80048 BASIC METABOLIC PNL TOTAL CA: CPT

## 2024-08-21 PROCEDURE — 6370000000 HC RX 637 (ALT 250 FOR IP): Performed by: INTERNAL MEDICINE

## 2024-08-21 RX ORDER — MIDODRINE HYDROCHLORIDE 5 MG/1
10 TABLET ORAL EVERY 6 HOURS
Status: DISCONTINUED | OUTPATIENT
Start: 2024-08-21 | End: 2024-08-21 | Stop reason: HOSPADM

## 2024-08-21 RX ADMIN — LEVOTHYROXINE SODIUM 100 MCG: 0.1 TABLET ORAL at 06:55

## 2024-08-21 RX ADMIN — MUPIROCIN: 20 OINTMENT TOPICAL at 09:11

## 2024-08-21 RX ADMIN — MIDODRINE HYDROCHLORIDE 10 MG: 5 TABLET ORAL at 09:11

## 2024-08-21 RX ADMIN — DULOXETINE HYDROCHLORIDE 60 MG: 60 CAPSULE, DELAYED RELEASE ORAL at 09:10

## 2024-08-21 RX ADMIN — METOCLOPRAMIDE 10 MG: 10 TABLET ORAL at 06:55

## 2024-08-21 RX ADMIN — METOCLOPRAMIDE 10 MG: 10 TABLET ORAL at 09:11

## 2024-08-21 RX ADMIN — DOXYCYCLINE HYCLATE 100 MG: 100 TABLET, COATED ORAL at 09:11

## 2024-08-21 RX ADMIN — ENOXAPARIN SODIUM 40 MG: 100 INJECTION SUBCUTANEOUS at 09:12

## 2024-08-21 RX ADMIN — MIDODRINE HYDROCHLORIDE 10 MG: 5 TABLET ORAL at 03:36

## 2024-08-21 ASSESSMENT — PAIN SCALES - GENERAL
PAINLEVEL_OUTOF10: 0

## 2024-08-21 NOTE — CARE COORDINATION
Dr Ayala updated writer, discharge today.  No discharge needs noted, PT/OT had signed off yesterday, pt has PCP and follows at [facility].  CHEPE Jamison-KRISTINA

## 2024-08-21 NOTE — DISCHARGE SUMMARY
Hospital Medicine Discharge Summary    Patient: Summer Joseph   : 1965     Admit Date: 2024   Discharge Date: 2024    Disposition:  [x]Home   []HHC  []SNF  []ECF  []Acute Rehab  []LTAC  []Hospice  Code status:  [x]Full  []DNR/CCA  []Limited (DNR/CCA with Do Not Intubate)  []DNRCC  Condition at Discharge: Stable  Primary Care Provider: Liborio Rosales MD    Admitting Provider: Machelle Yen MD  Discharge Provider: CHADD BARRON MD     Discharge Diagnoses:      Active Hospital Problems    Diagnosis     Intractable nausea and vomiting [R11.2]        Presenting Admission History:      59 y.o. 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 she does follow with oncology at the [facility] and last received chemotherapy last Thursday.  RT, depression, hypothyroidism, hypertension presented from home with intractable nausea and vomiting.  She was very weak, unable to cope at home and was admitted for further evaluation and management           Assessment/Plan:        nausea and vomiting : Suspect chemotherapy-induced.  She had her last chemo at the [facility] last Thursday.  She stated she often does get nausea vomiting post chemo    CT chest abdomen pelvis no acute PE no acute intra-abdominal pathology or evident metastatic disease  Continued home Reglan  Compazine and Zofran as needed in case of persistent symptoms can utilize Haldol  She did receive IV fluids.  Her nausea vomiting slowly improved, at the time of discharge she was tolerating oral intake without any problems     Hypotension  This was passed related to multiple doses of Dilaudid, unable to tolerate home midodrine  Downtrending lactic acid 2.8>1 no other evidence of endorgan hypoperfusion.  Low inflammatory markers procalcitonin 0.06/CRP 6.1 low suspicion for infection  We did monitor in ICU in case vasoactive agents are required.  Vasopressor agents were not

## 2024-08-21 NOTE — PLAN OF CARE
Pt tolerating care plan. Updated Dr Rosenberg:  Midodrine (10mg) was adjusted to q6 overnight per orders for soft BP: 75/29. 80/30s around 0300.  Pt was asymptomatic, stated she felt normal and that was her baseline BP.  Pt states at home she only takes midodrine PRN and wouldn't have taken it for that BP unless she had symptoms. Pt would like docs to talk to her [facility] providers so she can be discharged.  Pt states she's being held here for something that can't be changed (her baseline BP).   After midodrine SBP is 115-119 which pt states is high.     Pt is Aox4, sats 96-99 on RA, HR 60s while awake, cb 50 while sleeping. Pt reports there is no more NV.  Pt ate 25% of dinner. Pt is a standby assist to the bathroom, ambulates well. Port was flushed and saline locked.  Pt declined CHG bath but allowed me to clean around the site.  Pt wishes to bathe herself in the morning.     Problem: Discharge Planning  Goal: Discharge to home or other facility with appropriate resources  Outcome: Progressing  Flowsheets (Taken 8/20/2024 2000)  Discharge to home or other facility with appropriate resources:   Identify barriers to discharge with patient and caregiver   Arrange for needed discharge resources and transportation as appropriate   Identify discharge learning needs (meds, wound care, etc)     Problem: Pain  Goal: Verbalizes/displays adequate comfort level or baseline comfort level  Outcome: Progressing  Flowsheets (Taken 8/20/2024 2000)  Verbalizes/displays adequate comfort level or baseline comfort level:   Encourage patient to monitor pain and request assistance   Assess pain using appropriate pain scale     Problem: Safety - Adult  Goal: Free from fall injury  Outcome: Progressing  Flowsheets (Taken 8/20/2024 2000)  Free From Fall Injury:   Instruct family/caregiver on patient safety   Based on caregiver fall risk screen, instruct family/caregiver to ask for assistance with transferring infant if caregiver noted

## 2024-08-22 LAB
BACTERIA BLD CULT ORG #2: NORMAL
BACTERIA BLD CULT: NORMAL

## 2024-12-03 ENCOUNTER — OFFICE VISIT (OUTPATIENT)
Dept: ORTHOPEDIC SURGERY | Age: 59
End: 2024-12-03
Payer: COMMERCIAL

## 2024-12-03 VITALS — WEIGHT: 141 LBS | BODY MASS INDEX: 24.07 KG/M2 | HEIGHT: 64 IN

## 2024-12-03 DIAGNOSIS — M25.512 PAIN OF LEFT SHOULDER REGION: Primary | ICD-10-CM

## 2024-12-03 DIAGNOSIS — M75.102 ROTATOR CUFF SYNDROME OF LEFT SHOULDER: ICD-10-CM

## 2024-12-03 PROCEDURE — 20610 DRAIN/INJ JOINT/BURSA W/O US: CPT | Performed by: STUDENT IN AN ORGANIZED HEALTH CARE EDUCATION/TRAINING PROGRAM

## 2024-12-03 PROCEDURE — 99213 OFFICE O/P EST LOW 20 MIN: CPT | Performed by: STUDENT IN AN ORGANIZED HEALTH CARE EDUCATION/TRAINING PROGRAM

## 2024-12-03 RX ORDER — METHYLPREDNISOLONE ACETATE 40 MG/ML
80 INJECTION, SUSPENSION INTRA-ARTICULAR; INTRALESIONAL; INTRAMUSCULAR; SOFT TISSUE ONCE
Status: COMPLETED | OUTPATIENT
Start: 2024-12-03 | End: 2024-12-03

## 2024-12-03 RX ORDER — LIDOCAINE HYDROCHLORIDE 10 MG/ML
4 INJECTION, SOLUTION INFILTRATION; PERINEURAL ONCE
Status: COMPLETED | OUTPATIENT
Start: 2024-12-03 | End: 2024-12-03

## 2024-12-03 RX ADMIN — METHYLPREDNISOLONE ACETATE 80 MG: 40 INJECTION, SUSPENSION INTRA-ARTICULAR; INTRALESIONAL; INTRAMUSCULAR; SOFT TISSUE at 14:09

## 2024-12-03 RX ADMIN — LIDOCAINE HYDROCHLORIDE 4 ML: 10 INJECTION, SOLUTION INFILTRATION; PERINEURAL at 14:08

## 2024-12-03 NOTE — PROGRESS NOTES
and oriented X3 and is cooperative.       Left shoulder exam    Inspection:  Held in a normal posture. Normal contour at the acromioclavicular joint. No swelling, ecchymosis, or erythema about the shoulder. No atrophy appreciated. No scapular winging.     Palpation:  No subacromial crepitus.  Mild tenderness to the AC joint.  Very tender to the subacromial bursa posterior laterally    Range of Motion: Full passive and active ROM. Normal scapulothoracic rhythm.    Strength:  Normal supraspinatus, infraspinatus, and subscapularis muscle strength.    Stability: No anterior instability. No posterior instability.    Special Tests: Impingement findings are negative.  Positive [place]'s negative Speed    Other findings: The skin is warm dry and well perfused. 2+ radial pulse. Sensation is intact to light touch over the deltoid.          Radiology:         3 views of the left shoulder taken in the office today demonstrate no acute osseous abnormalities.  Well-preserved acromial interval and glenohumeral joint space.      Prior x-rays left shoulder reviewed by myself in the office demonstrate no acute osseous abnormalities.  Well-maintained glenohumeral joint space and AC joint space.  Appropriate acromiohumeral interval.    MRI results demonstrate adhesive capsulitis with thickened inferior capsule.  No signs of rotator cuff tear.             Assessment : 59-year-old female with left shoulder rotator cuff syndrome and subacromial bursitis    Impression:  Encounter Diagnoses   Name Primary?    Pain of left shoulder region Yes    Rotator cuff syndrome of left shoulder                Office Procedures:  Orders Placed This Encounter   Procedures    DRAIN/INJECT LARGE JOINT/BURSA    XR SHOULDER LEFT (MIN 2 VIEWS)     Standing Status:   Future     Number of Occurrences:   1     Standing Expiration Date:   12/2/2025           Plan:     At this stage the patient's frozen shoulder seems to have resolved nicely.  However she does

## 2025-03-20 ENCOUNTER — TELEPHONE (OUTPATIENT)
Dept: ORTHOPEDIC SURGERY | Age: 60
End: 2025-03-20

## 2025-03-20 NOTE — TELEPHONE ENCOUNTER
Imported medical / PT records for 1/11/23 to 3/1/25 into O for [facility]. Will Avalos    Sent the request to [facility] billing for processing.

## 2025-05-26 ENCOUNTER — HOSPITAL ENCOUNTER (EMERGENCY)
Age: 60
Discharge: HOME | End: 2025-05-26
Attending: EMERGENCY MEDICINE | Admitting: INTERNAL MEDICINE
Payer: COMMERCIAL

## 2025-05-26 ENCOUNTER — APPOINTMENT (OUTPATIENT)
Dept: GENERAL RADIOLOGY | Age: 60
End: 2025-05-26
Payer: COMMERCIAL

## 2025-05-26 ENCOUNTER — APPOINTMENT (OUTPATIENT)
Dept: CT IMAGING | Age: 60
End: 2025-05-26
Payer: COMMERCIAL

## 2025-05-26 VITALS
BODY MASS INDEX: 23.05 KG/M2 | DIASTOLIC BLOOD PRESSURE: 74 MMHG | SYSTOLIC BLOOD PRESSURE: 121 MMHG | WEIGHT: 135 LBS | HEART RATE: 100 BPM | RESPIRATION RATE: 28 BRPM | TEMPERATURE: 98.1 F | OXYGEN SATURATION: 98 % | HEIGHT: 64 IN

## 2025-05-26 DIAGNOSIS — J96.01 ACUTE RESPIRATORY FAILURE WITH HYPOXIA (HCC): Primary | ICD-10-CM

## 2025-05-26 DIAGNOSIS — C34.90 MALIGNANT NEOPLASM OF LUNG, UNSPECIFIED LATERALITY, UNSPECIFIED PART OF LUNG (HCC): ICD-10-CM

## 2025-05-26 LAB
ALBUMIN SERPL-MCNC: 3 G/DL (ref 3.4–5)
ALBUMIN/GLOB SERPL: 1.2 {RATIO} (ref 1.1–2.2)
ALP SERPL-CCNC: 102 U/L (ref 40–129)
ALT SERPL-CCNC: 15 U/L (ref 10–40)
ANION GAP SERPL CALCULATED.3IONS-SCNC: 10 MMOL/L (ref 3–16)
AST SERPL-CCNC: 20 U/L (ref 15–37)
BASE EXCESS BLDV CALC-SCNC: 3 MMOL/L (ref -3–3)
BASOPHILS # BLD: 0 K/UL (ref 0–0.2)
BASOPHILS NFR BLD: 0.4 %
BILIRUB SERPL-MCNC: 0.3 MG/DL (ref 0–1)
BUN SERPL-MCNC: 17 MG/DL (ref 7–20)
CALCIUM SERPL-MCNC: 10.1 MG/DL (ref 8.3–10.6)
CHLORIDE SERPL-SCNC: 97 MMOL/L (ref 99–110)
CO2 BLDV-SCNC: 30 MMOL/L
CO2 SERPL-SCNC: 27 MMOL/L (ref 21–32)
COHGB MFR BLDV: 2.4 % (ref 0–1.5)
CREAT SERPL-MCNC: 0.7 MG/DL (ref 0.6–1.2)
DEPRECATED RDW RBC AUTO: 15.9 % (ref 12.4–15.4)
EOSINOPHIL # BLD: 0 K/UL (ref 0–0.6)
EOSINOPHIL NFR BLD: 0.1 %
FLUAV RNA RESP QL NAA+PROBE: NOT DETECTED
FLUBV RNA RESP QL NAA+PROBE: NOT DETECTED
GFR SERPLBLD CREATININE-BSD FMLA CKD-EPI: >90 ML/MIN/{1.73_M2}
GLUCOSE SERPL-MCNC: 101 MG/DL (ref 70–99)
HCO3 BLDV-SCNC: 28.1 MMOL/L (ref 23–29)
HCT VFR BLD AUTO: 33 % (ref 36–48)
HGB BLD-MCNC: 11.1 G/DL (ref 12–16)
LACTATE BLDV-SCNC: 1.1 MMOL/L (ref 0.4–2)
LYMPHOCYTES # BLD: 0.5 K/UL (ref 1–5.1)
LYMPHOCYTES NFR BLD: 5.5 %
MCH RBC QN AUTO: 28.5 PG (ref 26–34)
MCHC RBC AUTO-ENTMCNC: 33.6 G/DL (ref 31–36)
MCV RBC AUTO: 84.8 FL (ref 80–100)
METHGB MFR BLDV: 0 %
MONOCYTES # BLD: 0.4 K/UL (ref 0–1.3)
MONOCYTES NFR BLD: 4.6 %
NEUTROPHILS # BLD: 7.6 K/UL (ref 1.7–7.7)
NEUTROPHILS NFR BLD: 89.4 %
O2 THERAPY: ABNORMAL
PCO2 BLDV: 45.4 MMHG (ref 40–50)
PH BLDV: 7.41 [PH] (ref 7.35–7.45)
PLATELET # BLD AUTO: 221 K/UL (ref 135–450)
PMV BLD AUTO: 8.9 FL (ref 5–10.5)
PO2 BLDV: 51.2 MMHG (ref 25–40)
POTASSIUM SERPL-SCNC: 3.9 MMOL/L (ref 3.5–5.1)
PROCALCITONIN SERPL IA-MCNC: 0.12 NG/ML (ref 0–0.15)
PROT SERPL-MCNC: 5.6 G/DL (ref 6.4–8.2)
RBC # BLD AUTO: 3.89 M/UL (ref 4–5.2)
SAO2 % BLDV: 84 %
SARS-COV-2 RNA RESP QL NAA+PROBE: NOT DETECTED
SODIUM SERPL-SCNC: 134 MMOL/L (ref 136–145)
WBC # BLD AUTO: 8.5 K/UL (ref 4–11)

## 2025-05-26 PROCEDURE — 93005 ELECTROCARDIOGRAM TRACING: CPT | Performed by: EMERGENCY MEDICINE

## 2025-05-26 PROCEDURE — 96361 HYDRATE IV INFUSION ADD-ON: CPT

## 2025-05-26 PROCEDURE — 71045 X-RAY EXAM CHEST 1 VIEW: CPT

## 2025-05-26 PROCEDURE — 80053 COMPREHEN METABOLIC PANEL: CPT

## 2025-05-26 PROCEDURE — 71260 CT THORAX DX C+: CPT

## 2025-05-26 PROCEDURE — 82803 BLOOD GASES ANY COMBINATION: CPT

## 2025-05-26 PROCEDURE — 96376 TX/PRO/DX INJ SAME DRUG ADON: CPT

## 2025-05-26 PROCEDURE — 85025 COMPLETE CBC W/AUTO DIFF WBC: CPT

## 2025-05-26 PROCEDURE — 6360000004 HC RX CONTRAST MEDICATION: Performed by: EMERGENCY MEDICINE

## 2025-05-26 PROCEDURE — 6360000002 HC RX W HCPCS: Performed by: NURSE PRACTITIONER

## 2025-05-26 PROCEDURE — 87636 SARSCOV2 & INF A&B AMP PRB: CPT

## 2025-05-26 PROCEDURE — 84145 PROCALCITONIN (PCT): CPT

## 2025-05-26 PROCEDURE — 99291 CRITICAL CARE FIRST HOUR: CPT

## 2025-05-26 PROCEDURE — 2580000003 HC RX 258: Performed by: EMERGENCY MEDICINE

## 2025-05-26 PROCEDURE — 94761 N-INVAS EAR/PLS OXIMETRY MLT: CPT

## 2025-05-26 PROCEDURE — 6360000002 HC RX W HCPCS: Performed by: EMERGENCY MEDICINE

## 2025-05-26 PROCEDURE — 96374 THER/PROPH/DIAG INJ IV PUSH: CPT

## 2025-05-26 PROCEDURE — 83605 ASSAY OF LACTIC ACID: CPT

## 2025-05-26 PROCEDURE — 2700000000 HC OXYGEN THERAPY PER DAY

## 2025-05-26 RX ORDER — SULFAMETHOXAZOLE AND TRIMETHOPRIM 800; 160 MG/1; MG/1
1 TABLET ORAL
COMMUNITY
Start: 2025-05-23 | End: 2025-05-26

## 2025-05-26 RX ORDER — METHADONE HYDROCHLORIDE 5 MG/1
5 TABLET ORAL 2 TIMES DAILY
Status: ON HOLD | COMMUNITY
Start: 2025-05-21 | End: 2025-06-22

## 2025-05-26 RX ORDER — 0.9 % SODIUM CHLORIDE 0.9 %
1000 INTRAVENOUS SOLUTION INTRAVENOUS ONCE
Status: COMPLETED | OUTPATIENT
Start: 2025-05-26 | End: 2025-05-26

## 2025-05-26 RX ORDER — HYDROMORPHONE HYDROCHLORIDE 1 MG/ML
1 INJECTION, SOLUTION INTRAMUSCULAR; INTRAVENOUS; SUBCUTANEOUS ONCE
Status: COMPLETED | OUTPATIENT
Start: 2025-05-26 | End: 2025-05-26

## 2025-05-26 RX ORDER — ALBUTEROL SULFATE 90 UG/1
2 INHALANT RESPIRATORY (INHALATION) 3 TIMES DAILY PRN
Status: ON HOLD | COMMUNITY
Start: 2025-05-02

## 2025-05-26 RX ORDER — ALBUTEROL SULFATE 90 UG/1
2 INHALANT RESPIRATORY (INHALATION) 3 TIMES DAILY PRN
Status: CANCELLED | OUTPATIENT
Start: 2025-05-26

## 2025-05-26 RX ORDER — HYDROMORPHONE HYDROCHLORIDE 2 MG/1
6 TABLET ORAL EVERY 4 HOURS PRN
Status: ON HOLD | COMMUNITY
Start: 2025-04-21

## 2025-05-26 RX ORDER — HYDROMORPHONE HYDROCHLORIDE 4 MG/1
4-6 TABLET ORAL EVERY 4 HOURS PRN
Status: ON HOLD | COMMUNITY
Start: 2025-05-21 | End: 2025-06-07

## 2025-05-26 RX ORDER — HYDROMORPHONE HYDROCHLORIDE 1 MG/ML
1 INJECTION, SOLUTION INTRAMUSCULAR; INTRAVENOUS; SUBCUTANEOUS ONCE
Status: DISCONTINUED | OUTPATIENT
Start: 2025-05-26 | End: 2025-05-26

## 2025-05-26 RX ORDER — IOPAMIDOL 755 MG/ML
75 INJECTION, SOLUTION INTRAVASCULAR
Status: COMPLETED | OUTPATIENT
Start: 2025-05-26 | End: 2025-05-26

## 2025-05-26 RX ORDER — HYDROMORPHONE HYDROCHLORIDE 1 MG/ML
1 INJECTION, SOLUTION INTRAMUSCULAR; INTRAVENOUS; SUBCUTANEOUS
Status: DISCONTINUED | OUTPATIENT
Start: 2025-05-26 | End: 2025-05-26 | Stop reason: HOSPADM

## 2025-05-26 RX ORDER — XYLITOL 500 MG
1 MUCO-ADHESIVE BUCCAL TABLET MUCOUS MEMBRANE EVERY 6 HOURS PRN
Status: ON HOLD | COMMUNITY
Start: 2025-05-23

## 2025-05-26 RX ADMIN — SODIUM CHLORIDE 1000 ML: 0.9 INJECTION, SOLUTION INTRAVENOUS at 11:20

## 2025-05-26 RX ADMIN — IOPAMIDOL 75 ML: 755 INJECTION, SOLUTION INTRAVENOUS at 10:09

## 2025-05-26 RX ADMIN — HYDROMORPHONE HYDROCHLORIDE 1 MG: 1 INJECTION, SOLUTION INTRAMUSCULAR; INTRAVENOUS; SUBCUTANEOUS at 12:41

## 2025-05-26 RX ADMIN — HYDROMORPHONE HYDROCHLORIDE 1 MG: 1 INJECTION, SOLUTION INTRAMUSCULAR; INTRAVENOUS; SUBCUTANEOUS at 07:37

## 2025-05-26 ASSESSMENT — PAIN DESCRIPTION - ONSET: ONSET: ON-GOING

## 2025-05-26 ASSESSMENT — PAIN SCALES - GENERAL
PAINLEVEL_OUTOF10: 7
PAINLEVEL_OUTOF10: 7

## 2025-05-26 ASSESSMENT — PAIN DESCRIPTION - ORIENTATION: ORIENTATION: RIGHT;LEFT

## 2025-05-26 ASSESSMENT — PAIN - FUNCTIONAL ASSESSMENT
PAIN_FUNCTIONAL_ASSESSMENT: ACTIVITIES ARE NOT PREVENTED
PAIN_FUNCTIONAL_ASSESSMENT: 0-10

## 2025-05-26 ASSESSMENT — PAIN DESCRIPTION - FREQUENCY: FREQUENCY: CONTINUOUS

## 2025-05-26 ASSESSMENT — PAIN DESCRIPTION - LOCATION
LOCATION: FOOT;BACK
LOCATION: LEG;BACK

## 2025-05-26 ASSESSMENT — PAIN DESCRIPTION - DESCRIPTORS: DESCRIPTORS: ACHING

## 2025-05-26 ASSESSMENT — PAIN DESCRIPTION - PAIN TYPE: TYPE: ACUTE PAIN;CHRONIC PAIN

## 2025-05-26 NOTE — ED PROVIDER NOTES
components within normal limits   COVID-19 & INFLUENZA COMBO   PROCALCITONIN   LACTIC ACID     When ordered only abnormal lab results are displayed. All other labs were within normal range or not returned as of this dictation.     RADIOLOGY:      Non-plain film images such as CT, Ultrasound and MRI are read by the radiologist. Plain radiographic images are visualized and preliminarily interpreted by the ED Provider with the below findings:   Interpretation per the Radiologist below, if available at the time of this note:  CT CHEST PULMONARY EMBOLISM W CONTRAST   Final Result      1. No evidence of pulmonary embolism.   2. Diffuse nodular interlobular septal thickening and fissural thickening throughout both lungs likely related to diffuse lymphangitic spread of tumor.   3. Moderate right greater than left pleural effusions with bibasilar atelectasis.   4. Diffuse mediastinal and hilar lymphadenopathy and a mildly enlarged lower right cervical lymph node consistent with metastatic disease.   5. Lytic bone metastases most prominently involving the sternum and left scapula.      Electronically signed by Michelet Kessler MD      XR CHEST PORTABLE   Final Result      1. New small bilateral pleural effusions and bilateral airspace/interstitial lung disease         Electronically signed by Sameer Veronica MD               EKG: Sinus tachycardia with a rate of 109.  Normal axis.  Normal intervals and durations.  No significant ST or T wave changes appreciated.  No acute signs of ischemia.    CRITICAL CARE TIME   I personally saw the patient and independently provided 35 minutes of non-concurrent critical care out of the total shared critical care time excluding separately billable procedures.      End-of-life care discussion: End-of-life care of 23 minutes completed with patient/family encouraging goals of care.  Patient initially resistant to hospice/palliative care and agreeable to admission.  However, upon reconsideration,

## 2025-05-26 NOTE — ED NOTES
Patient surgical report from 4/2024 surgical insertion of power port / chest xray 5/26/2025 read power port.

## 2025-05-26 NOTE — PLAN OF CARE
PLAN OF CARE    Received request for inpatient admission. Admitting provider Xochitl Paulino NP notified.    LY FINLEY MD  5/26/2025  11:09 AM

## 2025-05-26 NOTE — ED NOTES
Patient observed 80% RA, initiated 2L O2 NC.  Patient O2 saturation returned to 94%. Notified DO Carlos Enrique

## 2025-05-26 NOTE — ED NOTES
Writer ambulated patient with pulse oximeter at this time. Patient is primarily non-weight bearing at home d/t tumors and pathological fractures in feet, so patient stood and took a few steps as she would to get to the restroom at home.  O2 Sat 84% during this on RA, 88-89% at rest, on 2L NC pt stays 92-98% during this activity.  Patient reports increased SOB with exertion. Provider aware. Home orders for oxygen/ home health care placed, social work working on it now.  Pt updated on plan of care.

## 2025-05-26 NOTE — PROGRESS NOTES
I certify this patient under my care for Home Health with a face-to-face encounter on 5/26/2025.     I further certify that my clinical findings support that this patient is confined to home due to:  [x] Fall Risk   [x] Dyspnea with minimal exertion   [] Confined to wheelchair   [] Angina with minimal exertion  [] Angina at rest   [] Amputation   [] Bowel/Bladder incontinence   [] Contractures  [] CVA/Hemiparalysis/Dysphonia   [] Hearing impairment   [] Legally Blind  [] Mental status impairment   [] Paralysis   [] Speech impairment   [x] Other: Weakness and Debility 2/2 to underlying metastatic cancer    Initial Plan of Care: The patient’s Home Care needs include:  [] Administration of Medications   [x] Complex care plan management  [x] PT  [x] OT  [] SLP   [] Teaching/Training  [] Wound Care   [x] Observe/Assess   [] NG and Trach Aspiration/Care  [] Catheter Placement/Care   [] Ostomy Care   [] Psychiatric Eval/Therapy  [] Rehabilitation Nursing  [] Tube Feeding  [x] Other: Pallia care at home    Due to  Metastatic parotid cancer to the bone   .     Ongoing plan development and review by PCP - Liborio Rosales MD and [facility]

## 2025-05-26 NOTE — CARE COORDINATION
Referred to patient for home oxygen and home care.  Spoke to patient.  No preferences of agency.  [facility], unable to arrange [facility].  Referral to [employer].  Able to arrange today.  Portable tank given. Referral to [facility] for home care.  Patient and mother notified and agreeable.  Unable to arrange outpatient palliative care, however, patient states she has Palliative care which she really likes and doesn't want to change.  Patient asking about hospice.  Information provided and list of providers given and reviewed.  All questions answered.  MD and RN updated.  No other needs at this time.  Electronically signed by CONCHITA Cormier on 5/26/2025 at 1:48 PM

## 2025-05-27 LAB
EKG ATRIAL RATE: 109 BPM
EKG DIAGNOSIS: NORMAL
EKG P AXIS: 46 DEGREES
EKG P-R INTERVAL: 116 MS
EKG Q-T INTERVAL: 324 MS
EKG QRS DURATION: 76 MS
EKG QTC CALCULATION (BAZETT): 436 MS
EKG R AXIS: 86 DEGREES
EKG T AXIS: 36 DEGREES
EKG VENTRICULAR RATE: 109 BPM

## 2025-05-27 PROCEDURE — 93010 ELECTROCARDIOGRAM REPORT: CPT | Performed by: INTERNAL MEDICINE

## 2025-06-01 ENCOUNTER — APPOINTMENT (OUTPATIENT)
Dept: CT IMAGING | Age: 60
DRG: 189 | End: 2025-06-01
Payer: COMMERCIAL

## 2025-06-01 ENCOUNTER — HOSPITAL ENCOUNTER (INPATIENT)
Age: 60
LOS: 1 days | DRG: 189 | End: 2025-06-02
Attending: STUDENT IN AN ORGANIZED HEALTH CARE EDUCATION/TRAINING PROGRAM | Admitting: INTERNAL MEDICINE
Payer: COMMERCIAL

## 2025-06-01 ENCOUNTER — APPOINTMENT (OUTPATIENT)
Dept: GENERAL RADIOLOGY | Age: 60
DRG: 189 | End: 2025-06-01
Payer: COMMERCIAL

## 2025-06-01 DIAGNOSIS — R06.00 DYSPNEA AND RESPIRATORY ABNORMALITIES: Primary | ICD-10-CM

## 2025-06-01 DIAGNOSIS — R09.02 HYPOXIA: ICD-10-CM

## 2025-06-01 DIAGNOSIS — R06.89 DYSPNEA AND RESPIRATORY ABNORMALITIES: Primary | ICD-10-CM

## 2025-06-01 PROBLEM — J96.01 ACUTE RESPIRATORY FAILURE WITH HYPOXIA (HCC): Status: ACTIVE | Noted: 2025-06-01

## 2025-06-01 LAB
ANION GAP SERPL CALCULATED.3IONS-SCNC: 9 MMOL/L (ref 3–16)
BASE EXCESS BLDV CALC-SCNC: 7.2 MMOL/L (ref -3–3)
BASOPHILS # BLD: 0 K/UL (ref 0–0.2)
BASOPHILS NFR BLD: 0.1 %
BUN SERPL-MCNC: 18 MG/DL (ref 7–20)
CALCIUM SERPL-MCNC: 10.6 MG/DL (ref 8.3–10.6)
CHLORIDE SERPL-SCNC: 97 MMOL/L (ref 99–110)
CO2 BLDV-SCNC: 36 MMOL/L
CO2 SERPL-SCNC: 32 MMOL/L (ref 21–32)
COHGB MFR BLDV: 2.8 % (ref 0–1.5)
CREAT SERPL-MCNC: 0.7 MG/DL (ref 0.6–1.2)
DEPRECATED RDW RBC AUTO: 15.8 % (ref 12.4–15.4)
EKG ATRIAL RATE: 107 BPM
EKG DIAGNOSIS: NORMAL
EKG P AXIS: 36 DEGREES
EKG P-R INTERVAL: 112 MS
EKG Q-T INTERVAL: 310 MS
EKG QRS DURATION: 82 MS
EKG QTC CALCULATION (BAZETT): 413 MS
EKG R AXIS: 73 DEGREES
EKG T AXIS: 20 DEGREES
EKG VENTRICULAR RATE: 107 BPM
EOSINOPHIL # BLD: 0 K/UL (ref 0–0.6)
EOSINOPHIL NFR BLD: 0 %
GFR SERPLBLD CREATININE-BSD FMLA CKD-EPI: >90 ML/MIN/{1.73_M2}
GLUCOSE SERPL-MCNC: 119 MG/DL (ref 70–99)
HCO3 BLDV-SCNC: 34.1 MMOL/L (ref 23–29)
HCT VFR BLD AUTO: 32.9 % (ref 36–48)
HGB BLD-MCNC: 10.9 G/DL (ref 12–16)
LYMPHOCYTES # BLD: 0.2 K/UL (ref 1–5.1)
LYMPHOCYTES NFR BLD: 2.7 %
MCH RBC QN AUTO: 28.4 PG (ref 26–34)
MCHC RBC AUTO-ENTMCNC: 33.2 G/DL (ref 31–36)
MCV RBC AUTO: 85.4 FL (ref 80–100)
METHGB MFR BLDV: 0.1 %
MONOCYTES # BLD: 0.3 K/UL (ref 0–1.3)
MONOCYTES NFR BLD: 3.4 %
NEUTROPHILS # BLD: 7.4 K/UL (ref 1.7–7.7)
NEUTROPHILS NFR BLD: 93.8 %
NT-PROBNP SERPL-MCNC: 973 PG/ML (ref 0–124)
O2 THERAPY: ABNORMAL
PCO2 BLDV: 59.9 MMHG (ref 40–50)
PH BLDV: 7.37 [PH] (ref 7.35–7.45)
PLATELET # BLD AUTO: 256 K/UL (ref 135–450)
PMV BLD AUTO: 8.6 FL (ref 5–10.5)
PO2 BLDV: 76 MMHG (ref 25–40)
POTASSIUM SERPL-SCNC: 4.7 MMOL/L (ref 3.5–5.1)
RBC # BLD AUTO: 3.85 M/UL (ref 4–5.2)
SAO2 % BLDV: 95 %
SODIUM SERPL-SCNC: 138 MMOL/L (ref 136–145)
TROPONIN, HIGH SENSITIVITY: 24 NG/L (ref 0–14)
TROPONIN, HIGH SENSITIVITY: 28 NG/L (ref 0–14)
TSH SERPL DL<=0.005 MIU/L-ACNC: 1.37 UIU/ML (ref 0.27–4.2)
WBC # BLD AUTO: 7.9 K/UL (ref 4–11)

## 2025-06-01 PROCEDURE — 6360000002 HC RX W HCPCS: Performed by: NURSE PRACTITIONER

## 2025-06-01 PROCEDURE — 94761 N-INVAS EAR/PLS OXIMETRY MLT: CPT

## 2025-06-01 PROCEDURE — 2580000003 HC RX 258: Performed by: NURSE PRACTITIONER

## 2025-06-01 PROCEDURE — 36415 COLL VENOUS BLD VENIPUNCTURE: CPT

## 2025-06-01 PROCEDURE — 6370000000 HC RX 637 (ALT 250 FOR IP): Performed by: STUDENT IN AN ORGANIZED HEALTH CARE EDUCATION/TRAINING PROGRAM

## 2025-06-01 PROCEDURE — 84484 ASSAY OF TROPONIN QUANT: CPT

## 2025-06-01 PROCEDURE — 93005 ELECTROCARDIOGRAM TRACING: CPT | Performed by: STUDENT IN AN ORGANIZED HEALTH CARE EDUCATION/TRAINING PROGRAM

## 2025-06-01 PROCEDURE — 5A0945A ASSISTANCE WITH RESPIRATORY VENTILATION, 24-96 CONSECUTIVE HOURS, HIGH NASAL FLOW/VELOCITY: ICD-10-PCS | Performed by: INTERNAL MEDICINE

## 2025-06-01 PROCEDURE — 83880 ASSAY OF NATRIURETIC PEPTIDE: CPT

## 2025-06-01 PROCEDURE — 6360000002 HC RX W HCPCS: Performed by: STUDENT IN AN ORGANIZED HEALTH CARE EDUCATION/TRAINING PROGRAM

## 2025-06-01 PROCEDURE — 84443 ASSAY THYROID STIM HORMONE: CPT

## 2025-06-01 PROCEDURE — 6370000000 HC RX 637 (ALT 250 FOR IP): Performed by: NURSE PRACTITIONER

## 2025-06-01 PROCEDURE — 2700000000 HC OXYGEN THERAPY PER DAY

## 2025-06-01 PROCEDURE — 96374 THER/PROPH/DIAG INJ IV PUSH: CPT

## 2025-06-01 PROCEDURE — 82803 BLOOD GASES ANY COMBINATION: CPT

## 2025-06-01 PROCEDURE — 94640 AIRWAY INHALATION TREATMENT: CPT

## 2025-06-01 PROCEDURE — 99285 EMERGENCY DEPT VISIT HI MDM: CPT

## 2025-06-01 PROCEDURE — 85025 COMPLETE CBC W/AUTO DIFF WBC: CPT

## 2025-06-01 PROCEDURE — 96361 HYDRATE IV INFUSION ADD-ON: CPT

## 2025-06-01 PROCEDURE — 71045 X-RAY EXAM CHEST 1 VIEW: CPT

## 2025-06-01 PROCEDURE — 80048 BASIC METABOLIC PNL TOTAL CA: CPT

## 2025-06-01 PROCEDURE — 2060000000 HC ICU INTERMEDIATE R&B

## 2025-06-01 PROCEDURE — 93010 ELECTROCARDIOGRAM REPORT: CPT | Performed by: INTERNAL MEDICINE

## 2025-06-01 PROCEDURE — 2580000003 HC RX 258: Performed by: STUDENT IN AN ORGANIZED HEALTH CARE EDUCATION/TRAINING PROGRAM

## 2025-06-01 RX ORDER — LEVOTHYROXINE SODIUM 125 UG/1
125 TABLET ORAL DAILY
Status: DISCONTINUED | OUTPATIENT
Start: 2025-06-01 | End: 2025-06-02 | Stop reason: HOSPADM

## 2025-06-01 RX ORDER — HYDROMORPHONE HYDROCHLORIDE 2 MG/1
8 TABLET ORAL ONCE
Refills: 0 | Status: DISCONTINUED | OUTPATIENT
Start: 2025-06-01 | End: 2025-06-01

## 2025-06-01 RX ORDER — HYDROMORPHONE HYDROCHLORIDE 2 MG/1
8 TABLET ORAL EVERY 4 HOURS PRN
Refills: 0 | Status: DISCONTINUED | OUTPATIENT
Start: 2025-06-01 | End: 2025-06-02 | Stop reason: HOSPADM

## 2025-06-01 RX ORDER — FENTANYL CITRATE 50 UG/ML
25 INJECTION, SOLUTION INTRAMUSCULAR; INTRAVENOUS ONCE
Refills: 0 | Status: COMPLETED | OUTPATIENT
Start: 2025-06-01 | End: 2025-06-01

## 2025-06-01 RX ORDER — DEXAMETHASONE SODIUM PHOSPHATE 4 MG/ML
4 INJECTION, SOLUTION INTRA-ARTICULAR; INTRALESIONAL; INTRAMUSCULAR; INTRAVENOUS; SOFT TISSUE DAILY
Status: DISCONTINUED | OUTPATIENT
Start: 2025-06-02 | End: 2025-06-02

## 2025-06-01 RX ORDER — SODIUM CHLORIDE, SODIUM LACTATE, POTASSIUM CHLORIDE, AND CALCIUM CHLORIDE .6; .31; .03; .02 G/100ML; G/100ML; G/100ML; G/100ML
500 INJECTION, SOLUTION INTRAVENOUS ONCE
Status: COMPLETED | OUTPATIENT
Start: 2025-06-01 | End: 2025-06-01

## 2025-06-01 RX ORDER — SODIUM CHLORIDE 9 MG/ML
INJECTION, SOLUTION INTRAVENOUS PRN
Status: DISCONTINUED | OUTPATIENT
Start: 2025-06-01 | End: 2025-06-02 | Stop reason: HOSPADM

## 2025-06-01 RX ORDER — POLYETHYLENE GLYCOL 3350 17 G/17G
17 POWDER, FOR SOLUTION ORAL DAILY
Status: DISCONTINUED | OUTPATIENT
Start: 2025-06-01 | End: 2025-06-02 | Stop reason: HOSPADM

## 2025-06-01 RX ORDER — IOPAMIDOL 755 MG/ML
75 INJECTION, SOLUTION INTRAVASCULAR
Status: DISCONTINUED | OUTPATIENT
Start: 2025-06-01 | End: 2025-06-01

## 2025-06-01 RX ORDER — SODIUM CHLORIDE 0.9 % (FLUSH) 0.9 %
10 SYRINGE (ML) INJECTION EVERY 12 HOURS SCHEDULED
Status: DISCONTINUED | OUTPATIENT
Start: 2025-06-01 | End: 2025-06-02 | Stop reason: HOSPADM

## 2025-06-01 RX ORDER — ACETAMINOPHEN 325 MG/1
650 TABLET ORAL EVERY 6 HOURS PRN
Status: DISCONTINUED | OUTPATIENT
Start: 2025-06-01 | End: 2025-06-02 | Stop reason: HOSPADM

## 2025-06-01 RX ORDER — ALBUTEROL SULFATE 90 UG/1
2 INHALANT RESPIRATORY (INHALATION) 3 TIMES DAILY PRN
Status: DISCONTINUED | OUTPATIENT
Start: 2025-06-01 | End: 2025-06-02 | Stop reason: HOSPADM

## 2025-06-01 RX ORDER — DEXAMETHASONE 1 MG
1 TABLET ORAL EVERY 12 HOURS SCHEDULED
Status: DISCONTINUED | OUTPATIENT
Start: 2025-06-01 | End: 2025-06-02

## 2025-06-01 RX ORDER — ONDANSETRON 2 MG/ML
4 INJECTION INTRAMUSCULAR; INTRAVENOUS EVERY 6 HOURS PRN
Status: DISCONTINUED | OUTPATIENT
Start: 2025-06-01 | End: 2025-06-02 | Stop reason: HOSPADM

## 2025-06-01 RX ORDER — ONDANSETRON 2 MG/ML
4 INJECTION INTRAMUSCULAR; INTRAVENOUS ONCE
Status: COMPLETED | OUTPATIENT
Start: 2025-06-01 | End: 2025-06-01

## 2025-06-01 RX ORDER — METHADONE HYDROCHLORIDE 5 MG/1
5 TABLET ORAL EVERY 8 HOURS
Refills: 0 | Status: DISCONTINUED | OUTPATIENT
Start: 2025-06-01 | End: 2025-06-02 | Stop reason: HOSPADM

## 2025-06-01 RX ORDER — ENOXAPARIN SODIUM 100 MG/ML
30 INJECTION SUBCUTANEOUS DAILY
Status: DISCONTINUED | OUTPATIENT
Start: 2025-06-01 | End: 2025-06-01

## 2025-06-01 RX ORDER — SODIUM CHLORIDE 0.9 % (FLUSH) 0.9 %
10 SYRINGE (ML) INJECTION PRN
Status: DISCONTINUED | OUTPATIENT
Start: 2025-06-01 | End: 2025-06-02 | Stop reason: HOSPADM

## 2025-06-01 RX ORDER — ONDANSETRON 4 MG/1
4 TABLET, ORALLY DISINTEGRATING ORAL EVERY 8 HOURS PRN
Status: DISCONTINUED | OUTPATIENT
Start: 2025-06-01 | End: 2025-06-02 | Stop reason: HOSPADM

## 2025-06-01 RX ORDER — METHADONE HYDROCHLORIDE 5 MG/1
5 TABLET ORAL EVERY 12 HOURS
Refills: 0 | Status: DISCONTINUED | OUTPATIENT
Start: 2025-06-01 | End: 2025-06-01

## 2025-06-01 RX ORDER — HYDROMORPHONE HYDROCHLORIDE 1 MG/ML
1 INJECTION, SOLUTION INTRAMUSCULAR; INTRAVENOUS; SUBCUTANEOUS ONCE
Status: COMPLETED | OUTPATIENT
Start: 2025-06-01 | End: 2025-06-01

## 2025-06-01 RX ORDER — IPRATROPIUM BROMIDE AND ALBUTEROL SULFATE 2.5; .5 MG/3ML; MG/3ML
1 SOLUTION RESPIRATORY (INHALATION) ONCE
Status: COMPLETED | OUTPATIENT
Start: 2025-06-01 | End: 2025-06-01

## 2025-06-01 RX ORDER — SODIUM CHLORIDE 9 MG/ML
INJECTION, SOLUTION INTRAVENOUS CONTINUOUS
Status: DISCONTINUED | OUTPATIENT
Start: 2025-06-01 | End: 2025-06-02

## 2025-06-01 RX ORDER — ACETAMINOPHEN 650 MG/1
650 SUPPOSITORY RECTAL EVERY 6 HOURS PRN
Status: DISCONTINUED | OUTPATIENT
Start: 2025-06-01 | End: 2025-06-02 | Stop reason: HOSPADM

## 2025-06-01 RX ORDER — HYDROMORPHONE HYDROCHLORIDE 1 MG/ML
1 INJECTION, SOLUTION INTRAMUSCULAR; INTRAVENOUS; SUBCUTANEOUS
Status: DISCONTINUED | OUTPATIENT
Start: 2025-06-01 | End: 2025-06-02

## 2025-06-01 RX ADMIN — ONDANSETRON 4 MG: 2 INJECTION, SOLUTION INTRAMUSCULAR; INTRAVENOUS at 12:35

## 2025-06-01 RX ADMIN — APIXABAN 5 MG: 5 TABLET, FILM COATED ORAL at 16:27

## 2025-06-01 RX ADMIN — HYDROMORPHONE HYDROCHLORIDE 1 MG: 1 INJECTION, SOLUTION INTRAMUSCULAR; INTRAVENOUS; SUBCUTANEOUS at 12:50

## 2025-06-01 RX ADMIN — FENTANYL CITRATE 25 MCG: 50 INJECTION INTRAMUSCULAR; INTRAVENOUS at 10:37

## 2025-06-01 RX ADMIN — HYDROMORPHONE HYDROCHLORIDE 1 MG: 1 INJECTION, SOLUTION INTRAMUSCULAR; INTRAVENOUS; SUBCUTANEOUS at 21:39

## 2025-06-01 RX ADMIN — LEVOTHYROXINE SODIUM 125 MCG: 0.12 TABLET ORAL at 17:59

## 2025-06-01 RX ADMIN — SODIUM CHLORIDE: 0.9 INJECTION, SOLUTION INTRAVENOUS at 17:59

## 2025-06-01 RX ADMIN — SODIUM CHLORIDE, SODIUM LACTATE, POTASSIUM CHLORIDE, AND CALCIUM CHLORIDE 500 ML: .6; .31; .03; .02 INJECTION, SOLUTION INTRAVENOUS at 07:49

## 2025-06-01 RX ADMIN — IPRATROPIUM BROMIDE AND ALBUTEROL SULFATE 1 DOSE: .5; 3 SOLUTION RESPIRATORY (INHALATION) at 07:37

## 2025-06-01 RX ADMIN — HYDROMORPHONE HYDROCHLORIDE 8 MG: 2 TABLET ORAL at 16:26

## 2025-06-01 ASSESSMENT — PAIN DESCRIPTION - ORIENTATION
ORIENTATION: LOWER

## 2025-06-01 ASSESSMENT — PAIN DESCRIPTION - LOCATION
LOCATION: BACK

## 2025-06-01 ASSESSMENT — PAIN SCALES - GENERAL
PAINLEVEL_OUTOF10: 7
PAINLEVEL_OUTOF10: 7
PAINLEVEL_OUTOF10: 5
PAINLEVEL_OUTOF10: 4
PAINLEVEL_OUTOF10: 4

## 2025-06-01 ASSESSMENT — PAIN - FUNCTIONAL ASSESSMENT: PAIN_FUNCTIONAL_ASSESSMENT: 0-10

## 2025-06-01 ASSESSMENT — PAIN DESCRIPTION - DESCRIPTORS: DESCRIPTORS: SHARP;SHOOTING

## 2025-06-01 NOTE — ACP (ADVANCE CARE PLANNING)
Advance Care Planning     Advance Care Planning Inpatient Note  [facility] Department    Today's Date: 6/1/2025  Unit: [facility] C4 PCU    Received request from IDT Member and patient.  Upon review of chart and communication with care team, patient's decision making abilities are not in question.. Patient, Spouse, and Mother were present in the room during visit.    Goals of ACP Conversation:  Discuss advance care planning documents  Facilitate a discussion related to patient's goals of care as they align with the patient's values and beliefs.    Health Care Decision Makers:       Primary Decision Maker: Colt Alexssica - Child - [phone]    Secondary Decision Maker: MELIDAJAMEL - Parent - [phone]  Summary:  Completed New Documents  No Decision Maker named by patient at this time    Advance Care Planning Documents (Patient Wishes):  Living Will/Advance Directive  Wishes to discuss with family tonight before completing Health care Power of /Advanced Directive      Assessment:  Jeremiah clearly articulated her wishes in choosing to only complete the Living Will/Advanced Directive at this time. Accepted blank HPOA/Advanced Directive with plan to discuss with family tonight to discern health care decision makers.       Interventions:  Provided education on documents for clarity and greater understanding  Discussed and provided education on state decision maker hierarchy  Assisted in the completion of documents according to patient's wishes at this time  Encouraged ongoing ACP conversation with future decision makers and loved ones    Care Preferences Communicated:   No    Outcomes/Plan:  ACP Discussion: Completed  New advance directive completed.  Returned original document(s) to patient, as well as copies for distribution to appointed agents  Copy of advance directive given to staff to scan into medical record.  Teach Back Method used to verify the patient's and/or Healthcare Decision Maker's understanding of

## 2025-06-01 NOTE — PLAN OF CARE
Received Emergency Dept page for admission.  Sent to [facility] admitting/consulting hospitalist.

## 2025-06-01 NOTE — H&P
the left popliteal vein and peroneal vein. Occlusive thrombus in the left posterior tibial vein. Nonocclusive thrombus in the right popliteal, posterior tibial, and peroneal veins. The left common femoral, femoral, proximal deep femoral, and veins and saphenofemoral junction demonstrate color flow and compressibility. The right common femoral, femoral, proximal deep femoral, and veins and saphenofemoral junction demonstrate color flow and compressibility. The visualized bilateral anterior tibial demonstrate color flow.    1.  Nonocclusive thrombus in the left popliteal vein and peroneal vein. Occlusive thrombus in the left posterior tibial vein. 2.  Nonocclusive thrombus in the right popliteal, posterior tibial, and peroneal veins. Loosely appearing clot suggests instability in the clot of the right popliteal vein. ACTIONABLE ITEMS/RECOMMENDATIONS*: Critical findings of DVT were discussed with Chanelle MONZON by Lucas MONZON via telephone on  5/15/2025 2:39 AM *An Actionable Finding is a finding that may be unrelated to the original reason for imaging but potentially actionable, meaning further investigation may be necessary. The Actionable Findings Vigilance Unit (AFVU) assists medical providers with responding to additional radiologic findings that are unexpected and potentially actionable. I personally reviewed these image(s) along with the resident's/fellow's interpretations, certify that if a procedure was performed I was physically present, and agree with the final report.    CT CHEST ABDOMEN PELVIS W CONTRAST  Result Date: 5/15/2025  FULL RESULT: Examination: CT CHEST ABDOMEN PELVIS W CONTRAST on 5/15/2025 12:31 AM. Clinical History: Failure to thrive. Indication: Cancer staging or restaging. Comparison: None. Technique: CT CHEST ABDOMEN PELVIS W CONTRAST. Findings: CHEST: Lungs and Pleura: There are bilateral pulmonary metastases. An example is a 0.9 cm right lower lobe metastasis on series 7 image 74. There is diffuse  interstitial nodularity along with nodularity of the fissures consistent with lymphangitic spread. There are small bilateral pleural effusions. The underlying pleural surfaces are nodular consistent with metastatic disease. Cardiomediastinum: There are pulmonary emboli with and segmental and subsegmental branches supplying the right lower lobe. There is no heart strain. The right chest port catheter terminates in the lower SVC Lymph nodes: Mediastinal and hilar adenopathy is present including a 2 cm left hilar node on series 6 image 44 and a 2.3 cm subcarinal node on series 6 image 53. ABDOMEN AND PELVIS: Hepatobiliary: There are some scattered subcentimeter indeterminate liver lesions. The largest measures 0.4 cm and is in segment IV on series 6 image 179. No biliary dilatation. Cholecystectomy Spleen: No splenomegaly. Pancreas: No mass or ductal dilatation. Adrenal Glands: No mass. Kidneys, Ureters, Bladder: No hydronephrosis. There is an exophytic 1.6 cm mass arising from the anterior right inferior pole on image 388 which could be a metastasis versus a primary renal cell carcinoma. No bladder mass. Gastrointestinal Tract: A coloanal anastomosis is present with no obstruction or local recurrence Pelvic Organs: No pelvic mass Peritoneum/Retroperitoneum: A 0.4 cm peritoneal implant is on image 183 in the left upper corner and Lymph Nodes: No lymphadenopathy. MUSCULOSKELETAL: Soft tissue metastases are present. An example is a 2 cm metastasis in the right internal oblique muscle on image 203. Multifocal skeletal metastases are present.    1. Metastatic disease involving the lungs, pleura, thoracic nodes, peritoneum, soft tissues and skeleton 2. Pulmonary emboli without right heart strain 3. Right renal mass could represent a renal cell carcinoma versus metastasis ACTIONABLE ITEMS/RECOMMENDATIONS*: Critical Finding: Pulmonary emboli Recommendation: Treatment as indicated Findings were discussed with and acknowledged

## 2025-06-01 NOTE — PROGRESS NOTES
Spiritual Health History and Assessment/Progress Note  [facility]    Advance Care Planning, Spiritual/Emotional Needs, Emotional distress, Relationship concerns, Family Care, Adjustment to illness, Life Adjustments, Anticipatory Grief,      Name: Summer Joseph MRN: [id number]    Age: 60 y.o.     Sex: female   Language: English   Restorationism: Roman Catholic   Acute respiratory failure with hypoxia (HCC)     Date: 6/1/2025            Total Time Calculated: 88 min              Spiritual Assessment began in [place] [address] PCU        Referral/Consult From: Nurse   Encounter Overview/Reason: Advance Care Planning, Spiritual/Emotional Needs  Service Provided For: Patient    Yvonne, Belief, Meaning:   Patient identifies as spiritual, is connected with a yvonne tradition or spiritual practice, and has beliefs or practices that help with coping during difficult times  Family/Friends identify as spiritual, are connected with a yvonne tradition or spiritual practice, and have beliefs or practices that help with coping during difficult times      Importance and Influence:  Patient has spiritual/personal beliefs that influence decisions regarding their health  Family/Friends have spiritual/personal beliefs that influence decisions regarding the patient's health    Community:  Patient is connected with a spiritual community and feels well-supported. Support system includes: Spouse/Partner, Parent/s, Children, and Extended family  Family/Friends are connected with a spiritual community: and feel well-supported. Support system includes: Spouse/Partner, Parent/s, Children, and Extended family    Assessment and Plan of Care:     Patient Interventions include: Facilitated expression of thoughts and feelings, Explored spiritual coping/struggle/distress, Affirmed coping skills/support systems, Provided sacramental/Anabaptism ritual, and Assisted in Advance Care Planning conversation  Family/Friends Interventions include:

## 2025-06-01 NOTE — PROGRESS NOTES
06/01/25 0737   Treatment   Treatment Type HHN   $Treatment Type $Inhaled Therapy/Meds   Medications Albuterol/Ipratropium   Pre-Tx Pulse 109   Pre-Tx Resps 20   Breath Sounds Pre-Tx CHIARA Diminished;End expiratory wheezes   Breath Sounds Pre-Tx LLL Diminished;Fine crackles   Breath Sounds Pre-Tx RUL Diminished;End Expiratory wheezes   Breath Sounds Pre-Tx RML Diminished;End expiratory wheezes;Fine crackles   Breath Sounds Pre-Tx RLL Diminished;End expiratory wheezes;Fine crackles   Delivery Source Mask   Position Galeano's   Treatment Tolerance Well   Is patient on O2? Y   Oxygen Therapy/Pulse Ox   O2 Therapy Oxygen   $Oxygen $Daily Charge   O2 Device High flow nasal cannula   O2 Flow Rate (L/min) 10 L/min   Pulse (!) 109   Respirations 20   SpO2 96 %   $Pulse Oximeter $Spot check (multiple/continuous)     Attempted to place BiPAP on patient, but she immediately moved her head away and refused. Placed pt on high flow nasal cannula at 10L, pt states it is helping her breathing. Attempted to run a second breathing tx because the patient said it was helping, but she took the mask off and said \"I can't do this.\"

## 2025-06-01 NOTE — ED NOTES
Summer Joseph is a 60 y.o. female admitted for  Principal Problem:    Acute respiratory failure with hypoxia (HCC)  Resolved Problems:    * No resolved hospital problems. *  .   Patient Home via EMS transportation with   Chief Complaint   Patient presents with    Altered Mental Status     PER EMS PATIENT HAD AN ALTERATION IN MENTAL STATUS THIS MORNING.  CALLED EMS   .  Patient is alert and Person, Place, Time, and Situation  Patient's baseline mobility: Baseline Mobility: Walker  Code Status: Prior   Cardiac Rhythm:    O2 Flow Rate (L/min): 10 L/min  Is patient on baseline Oxygen: yes,  how many Liters:   Abnormal Assessment Findings:     Isolation: None      NIH Score:    C-SSRS: Risk of Suicide: No Risk  Bedside swallow:        Active LDA's:    Patient admitted with a keyes:  If the keyes is chronic was it exchanged:  Reason for keyes:   Patient admitted with Central Line:  . PICC line placement confirmed:   Reason for Central line:   Was central line Inserted from an outside facility:        Family/Caregiver Present yes Any Concerns: no   Restraints no  Sitter no         Vitals: MEWS Score: 3    Vitals:    06/01/25 0939 06/01/25 1021 06/01/25 1104 06/01/25 1144   BP: 101/66 (!) 79/59 108/66 98/64   Pulse: (!) 113 (!) 117 (!) 110 (!) 109   Resp: (!) 32 23 22 29   Temp:       TempSrc:       SpO2: 99% 98%  95%   Weight:       Height:           Last documented pain score (0-10 scale) Pain Level: 4  Pain medication administered Yes- see MAR.    Pertinent or High Risk Medications/Drips: No.    Pending Blood Product Administration: no    Abnormal labs:   Abnormal Labs Reviewed   CBC WITH AUTO DIFFERENTIAL - Abnormal; Notable for the following components:       Result Value    RBC 3.85 (*)     Hemoglobin 10.9 (*)     Hematocrit 32.9 (*)     RDW 15.8 (*)     Lymphocytes Absolute 0.2 (*)     All other components within normal limits   BASIC METABOLIC PANEL W/ REFLEX TO MG FOR LOW K - Abnormal; Notable for the  following components:    Chloride 97 (*)     Glucose 119 (*)     All other components within normal limits   BLOOD GAS, VENOUS - Abnormal; Notable for the following components:    pCO2, Reginald 59.9 (*)     PO2, Reginald 76.0 (*)     HCO3, Venous 34.1 (*)     Base Excess, Reginald 7.2 (*)     Carboxyhemoglobin 2.8 (*)     All other components within normal limits   TROPONIN - Abnormal; Notable for the following components:    Troponin, High Sensitivity 28 (*)     All other components within normal limits   TROPONIN - Abnormal; Notable for the following components:    Troponin, High Sensitivity 24 (*)     All other components within normal limits   BRAIN NATRIURETIC PEPTIDE - Abnormal; Notable for the following components:    NT Pro- (*)     All other components within normal limits     Critical values: no  Intervention for critical value(s):     Abnormal Imaging: no             You may also review the ED PT Care Timeline found under the Summary Tab, ED Encounter Summary, Timeline Reports, ED Patient Care Timeline.     Recommendation    Pending orders/Uncompleted orders to hand off:  none    Additional Comments:   If any further questions, please call Sending RN at [phone]

## 2025-06-01 NOTE — ED NOTES
Patient continues to complain of lower back/buttocks pain.  Hospital  bed obtained and patient transitioned.  Pillow placed under buttocks.  Fresh warm blankets provided to patient.  Patient provided with ice water.  Family updated on plan of care.  No further needs at this time.

## 2025-06-01 NOTE — ED NOTES
Patient's right chest port accessed - sterile technique used.  Positive blood return.  Patient tolerated well

## 2025-06-01 NOTE — PROGRESS NOTES
Patient just arrived to unit from ED. A&O x4.   /71   Pulse (!) 116   Temp 98.3 °F (36.8 °C) (Oral)   Resp 24   Ht 1.626 m (5' 4\")   Wt 49 kg (108 lb)   LMP 01/27/2012   SpO2 93%   BMI 18.54 kg/m²   Currently on 10L high flow

## 2025-06-01 NOTE — ED NOTES
Patient escorted to CT scan - once there states \"I will die if I lie flat.\"  Offered to speak with provider regarding medications to help with scan - patient declined, declined scan.  States she \"isn't ready to die.\"  Returned to room, fresh warm blanket applied.  Patient adjusted in bed for comfort.  Family updated.  Dr Gillette aware of patient's refusal of CT scan.

## 2025-06-01 NOTE — ED NOTES
Patient provided with hot pack for lower back, additional pillows and blankets.  Patient provided with starry and crackers per her request.  Family and patient updated on plan of care.

## 2025-06-01 NOTE — ED PROVIDER NOTES
[NG]   0825 Sodium: 138 [NG]   0825 Potassium: 4.7 [NG]   0825 Chloride(!): 97 [NG]   0825 CARBON DIOXIDE: 32 [NG]   0825 Anion Gap: 9 [NG]   0825 Glucose(!): 119 [NG]   0825 BUN,BUNPL: 18 [NG]   0825 Creatinine: 0.7 [NG]   0825 Calcium: 10.6  Renal panel unremarkable [NG]   0825 WBC: 7.9  Renal panel unremarkable [NG]   0825 Hemoglobin Quant(!): 10.9 [NG]   0825 Hematocrit(!): 32.9 [NG]   0825 Platelet Count: 256  Chronic anemia [NG]   0826 Chest x-ray I favor overall consistent with her known disease progression without significant acute abnormality awaiting CT imaging [NG]   0826 Reflecting on the patient's prior visit to the emerge department today her vital signs are significantly abnormal with persistent tachycardia worsening hypoxia and relative hypotension.  Overall I believe this is related to progression of her known disease.  I will continue to support her condition but anticipate she will benefit from hospitalization. [NG]   0946 Troponin, High Sensitivity(!): 24  Troponin downtrending favor mild demand ischemia secondary to tachycardia [NG]   0947 I was notified by nursing staff patient is refusing imaging we will reassess her clinical condition in anticipation of hospitalization [NG]   1032 I reassessed the patient she is provided pain medications.  She is slightly improved overall though remains quite medically frail.  At this time I believe her presenting condition is a reflection of her advancing lung disease in the context of metastatic cancer.  I have less suspicion for a primary cardiac condition or underlying infectious process.  I discussed at length with family and patient bedside neck steps at this time we will admit for further characterization and stabilization as the patient is requiring high flow nasal cannula oxygen.  The patient however remains hopeful that she may discharge to home following this hospitalization.  This concludes her ED course. [NG]      ED Course User Index  [NG] Leta

## 2025-06-01 NOTE — ED NOTES
Received patient awake and alert in bed, on high flow nasal cannula at 10 LPM. Assisted to bedside commode, able to urinate and pass small amount of bowel movement. Noted with shortness of breath on exertion.

## 2025-06-02 ENCOUNTER — RESULTS FOLLOW-UP (OUTPATIENT)
Dept: EMERGENCY DEPT | Age: 60
End: 2025-06-02

## 2025-06-02 VITALS
RESPIRATION RATE: 28 BRPM | HEIGHT: 64 IN | TEMPERATURE: 98.6 F | WEIGHT: 108 LBS | DIASTOLIC BLOOD PRESSURE: 60 MMHG | OXYGEN SATURATION: 81 % | SYSTOLIC BLOOD PRESSURE: 102 MMHG | BODY MASS INDEX: 18.44 KG/M2

## 2025-06-02 PROCEDURE — 94761 N-INVAS EAR/PLS OXIMETRY MLT: CPT

## 2025-06-02 PROCEDURE — 6360000002 HC RX W HCPCS: Performed by: NURSE PRACTITIONER

## 2025-06-02 PROCEDURE — 2700000000 HC OXYGEN THERAPY PER DAY

## 2025-06-02 RX ORDER — DEXAMETHASONE SODIUM PHOSPHATE 4 MG/ML
4 INJECTION, SOLUTION INTRA-ARTICULAR; INTRALESIONAL; INTRAMUSCULAR; INTRAVENOUS; SOFT TISSUE EVERY 24 HOURS
Status: DISCONTINUED | OUTPATIENT
Start: 2025-06-02 | End: 2025-06-02 | Stop reason: HOSPADM

## 2025-06-02 RX ORDER — LORAZEPAM 2 MG/ML
1 INJECTION INTRAMUSCULAR
Status: DISCONTINUED | OUTPATIENT
Start: 2025-06-02 | End: 2025-06-02 | Stop reason: HOSPADM

## 2025-06-02 RX ORDER — MORPHINE SULFATE 4 MG/ML
4 INJECTION, SOLUTION INTRAMUSCULAR; INTRAVENOUS
Status: DISCONTINUED | OUTPATIENT
Start: 2025-06-02 | End: 2025-06-02 | Stop reason: HOSPADM

## 2025-06-02 RX ADMIN — HYDROMORPHONE HYDROCHLORIDE 1 MG: 1 INJECTION, SOLUTION INTRAMUSCULAR; INTRAVENOUS; SUBCUTANEOUS at 02:28

## 2025-06-02 RX ADMIN — HYDROMORPHONE HYDROCHLORIDE 1 MG: 1 INJECTION, SOLUTION INTRAMUSCULAR; INTRAVENOUS; SUBCUTANEOUS at 06:41

## 2025-06-02 RX ADMIN — MORPHINE SULFATE 4 MG: 4 INJECTION, SOLUTION INTRAMUSCULAR; INTRAVENOUS at 13:10

## 2025-06-02 RX ADMIN — LORAZEPAM 1 MG: 2 INJECTION INTRAMUSCULAR; INTRAVENOUS at 13:54

## 2025-06-02 RX ADMIN — MORPHINE SULFATE 4 MG: 4 INJECTION, SOLUTION INTRAMUSCULAR; INTRAVENOUS at 09:27

## 2025-06-02 RX ADMIN — DEXAMETHASONE SODIUM PHOSPHATE 4 MG: 4 INJECTION INTRA-ARTICULAR; INTRALESIONAL; INTRAMUSCULAR; INTRAVENOUS; SOFT TISSUE at 10:35

## 2025-06-02 RX ADMIN — MORPHINE SULFATE 4 MG: 4 INJECTION, SOLUTION INTRAMUSCULAR; INTRAVENOUS at 11:44

## 2025-06-02 ASSESSMENT — PAIN SCALES - GENERAL: PAINLEVEL_OUTOF10: 0

## 2025-06-02 ASSESSMENT — PAIN SCALES - WONG BAKER
WONGBAKER_NUMERICALRESPONSE: NO HURT
WONGBAKER_NUMERICALRESPONSE: NO HURT

## 2025-06-02 NOTE — PROGRESS NOTES
Followed up with Pt's family at bedside after her passing. Support and prayer provided. Bereavement packet prepared.    Shashank Amor

## 2025-06-02 NOTE — PROGRESS NOTES
Care assumed from previous RN. A&O, sinus tach with HR 110s on tele, assessment complete as documented. Pt tachypneic with RR 20s+ and tripoding, SpO2 WNL on 10L, continuous oximetry in place. Several family members at bedside. Bed alarm activated. Pt and family deny needs at this time.

## 2025-06-02 NOTE — PROGRESS NOTES
Pt awakened after a brief nap in severe pain, unable to adequately verbalize needs and clearly in distress. Continued with extremely labored breathing.    Message placed to cross cover, orders rec'd for IV Dilaudid since pt unable to safely take po. Administered; will monitor for therapeutic effect.

## 2025-06-02 NOTE — DISCHARGE SUMMARY
Hospital Medicine Discharge Summary  V 1.6    Patient: Summer Joseph   : 1965     Primary Care Provider: Liborio Rosales MD  Admitting Provider: Shashank Nathan MD  Discharge Provider: TYRELL Horn     Hospital:  [facility]  Admit Date: 2025   Disposition:      Date of Death: 25  Time of Death: 1418    Immediate Cause of Death: respiratory arrest  Underlying Conditions: metastatic cancer  Other Contributing Conditions: respiratory failure     Discharge Diagnoses:    Active Hospital Problems    Diagnosis     Acute respiratory failure with hypoxia (HCC) [J96.01]        Presenting Admission History:   60 y.o. female who presented to [facility] with dyspnea and confusion. 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 History was obtained from the patient, her daughter, and review of the EMR. The patient has been working with palliative care on an outpatient basis who has been recommending transition to hospice care for the patient. She was not quite ready to move forward with hospice yet, however she states that she is more interested in possibly pursuing this now. During my visit, she was requiring 10 L oxygen per nasal cannula and is having immense pain in her back. She is currently limited code, but states that she would want defibrillation/cardioversion if needed. She declined any chest compressions or intubation/ventilation. CT scan of chest initially ordered in the ED, patient declined this. She tells me that her hope is to be discharged home soon with hospice care at home. She will be admitted for further evaluation.     Her oxygen requirement worsened, requiring max vapotherm and oxygen per NRB, continuing to be hypoxic. Her family ultimately decided that they did not want to pursue further medical treatment and move to comfort care only.

## 2025-06-02 NOTE — PLAN OF CARE
Time of death 1418  Absence of  heart sounds for one minute, witnessed by primary RN Ana Vargas  Second RN for confirmation of death, Denzel Hernadez, RN, absence of heart sounds for one minute  Charge RN, Spiritual Care, Cassandra Kinney, [facility], [facility] contacted

## 2025-06-02 NOTE — PLAN OF CARE
Post-mortem care completed  Family requests the 2 pillows, blanket, and Bible be returned with pt's body to  home for .  Right sub-clavical port de-accessed  Transport for morgue in place

## 2025-06-02 NOTE — CARE COORDINATION
Palliative care consulted . Per attending death is most likely imminent. Spouse is listed as primary contact. [employer] commercial insurance and PCP listed. Chart reviewed and CM following peripherally and not involved at present.

## 2025-06-02 NOTE — PROGRESS NOTES
Heated high flow in place. Family in room are opposed to labs being drawn at this time; spouse is en route, will discuss desire for VBG with him as well.

## 2025-06-02 NOTE — PROGRESS NOTES
2317: SpO2 down to 79% on 10L high flow, increased to 13L and then 15L. SpO2 90%.  2340: Down to 81% on 15L. Attempt at using only blow-by supplement via NRB unsuccessful. NRB placed loosely, SpO2 up to goal range.  2358: Down to 85%. Mask tightened.    Eventual recover of SpO2 to 93%. Mother and daughter at bedside aware of increase in requirements and participatory in care conversation.

## 2025-06-02 NOTE — PROGRESS NOTES
SpO2 on 15L & NRB dropped suddenly from 94%, stable for > 2 hrs, to 85%. Unchanged despite attempts at repositioning pt and adjusting devices. Message placed to cross cover to report condition change and request heated high flow support. Spouse Scotty, updated by phone, states he's coming to bedside ASAP.

## 2025-06-02 NOTE — PROGRESS NOTES
Decision made jointly between spouse, children, and mother to forgo bloodwork and prioritize comfort over optimal respiratory status. Dilaudid 1 mg IV administered to ensure pt comfort.   I called and informed patient's mother, Toshia, and she voiced understanding

## 2025-06-02 NOTE — PROGRESS NOTES
RN had arranged for volunteers to provide coloring books and colored pencils.  went to volunteer office, picked up items, and provided them to family.    Shashank Amor

## 2025-06-02 NOTE — ACP (ADVANCE CARE PLANNING)
Advance Care Planning     Palliative Team Advance Care Planning (ACP) Conversation    Date of Conversation: 06/02/25    Individuals present for the conversation: Spouse , Scotty, Daughter pts 2 dtr's, and pts mom     ACP documents on file prior to discussion:  -None    Previously completed document/s not on file: Not discussed.    Healthcare Decision Maker:    Primary Decision Maker: Amara Alex - Child - [phone]    Secondary Decision Maker: JAMEL ALEX - Parent - [phone]     Resuscitation Status:   Code Status: Limited  Code status amended to DNR-CC to reflect care goals and pts status.  Family very supportive of NO heroics    Documentation Completed:  -Other Pt incapable of completing any directives at this time    Palliative Care Initial Note  Palliative Care Admit date: 6/2/25  Reason for c/s:  GOC, Metastatic cancer    Advance Directives:  In the absence of a HCPOA, pts spouse is viewed as her NOK.  However, pts spouse, pts mom and pts children have worked well together to determine the goals for pt at this juncture    Plan of care/goals: Met w/ the aforementioned family members.  Writer pointed out pts agonal breathing and fact that it is not a long term, sustainable respiratory pattern.  Family agree that we will not replace vapotherm and, once all family present/ready and pt has had comfort meds given, we can look to replace the NRB mask w/ 2L/nc. Pts sats already in the 80's.  Pts hands are very dusky and her knees are slightly mottled.  She is warm, a little sweaty to touch.     Have d/w Hospitalist NP, shift RN, charge RN and .  All in agreement to support pt/family w/ comfort care. Have discussed comfort meds w/ hospitalist.      Social/Spiritual: Along w/ the other family members present for this morning's discussion, pts son, this three little children and spouse's two son's are anticipated to arrive this morning.  Pt has been visited by personal clergy and our chaplains.  Have asked  our  to return given there are multiple family members and some children present.  Pt is Bahai.      Plan: Pt appears to be imminently dying and family wish to focus care on comfort measures. They do not believe, and team concurs, that pt is unlikely to survive any transport home and comfort measures are being initiated here.      Palliative Performance Scale:   [] 60%  Amb reduced; Sig dz. Can't do hobbies/housework; Intake normal or reduced, Occasional assist; LOC full/confusion   [] 50%  Mainly sit/lie; Extensive disease. Mainly assist, Intake normal or reduced; Occasional assist; LOC full/confusion   [] 40%  Mainly in bed; Extensive disease; Mainly assist; Intake normal or reduced; Occasional assist; LOC full/confusion   [] 30%  Bed bound, Extensive disease; Total care; Intake reduced; LOC full/confusion   [] 20%  Bed bound; Extensive disease; Total care; Intake minimal; Drowsy/coma   [x] 10%  Bed bound; Extensive disease; Total care; Mouth care only; Drowsy/coma   []  0%   Death       Reason for consult:  ___ Advance Care Planning  _X_ Transition of Care Planning  _X_ Psychosocial/Spiritual Support  _X_ Symptom Management        I spent 65 minutes with the patient and/or surrogate decision maker discussing the patient's wishes and goals.                                                                                                                                                                                                                                                                            Cassandra Kinney RN

## 2025-06-02 NOTE — CONSULTS
Palliative Care Initial Note  Palliative Care Admit date: 6/2/25    ACP/palcare referral noted

## (undated) DEVICE — HANDPIECE SET WITH HIGH FLOW TIP AND SUCTION TUBE: Brand: INTERPULSE